# Patient Record
Sex: FEMALE | Race: BLACK OR AFRICAN AMERICAN | Employment: UNEMPLOYED | URBAN - METROPOLITAN AREA
[De-identification: names, ages, dates, MRNs, and addresses within clinical notes are randomized per-mention and may not be internally consistent; named-entity substitution may affect disease eponyms.]

---

## 2021-11-30 ENCOUNTER — HOSPITAL ENCOUNTER (INPATIENT)
Age: 66
LOS: 2 days | Discharge: HOME | DRG: 468 | End: 2021-12-02
Attending: EMERGENCY MEDICINE | Admitting: STUDENT IN AN ORGANIZED HEALTH CARE EDUCATION/TRAINING PROGRAM
Payer: MEDICAID

## 2021-11-30 ENCOUNTER — APPOINTMENT (OUTPATIENT)
Dept: CT IMAGING | Age: 66
DRG: 468 | End: 2021-11-30
Payer: MEDICAID

## 2021-11-30 ENCOUNTER — APPOINTMENT (OUTPATIENT)
Dept: ULTRASOUND IMAGING | Age: 66
DRG: 468 | End: 2021-11-30
Payer: MEDICAID

## 2021-11-30 DIAGNOSIS — R74.8 ELEVATED LIPASE: ICD-10-CM

## 2021-11-30 DIAGNOSIS — R52 INTRACTABLE PAIN: ICD-10-CM

## 2021-11-30 DIAGNOSIS — R10.12 INTRACTABLE LEFT UPPER QUADRANT ABDOMINAL PAIN: Primary | ICD-10-CM

## 2021-11-30 DIAGNOSIS — R93.89 ABNORMAL FINDING ON CT SCAN: ICD-10-CM

## 2021-11-30 PROBLEM — R10.9 ABDOMINAL PAIN: Status: ACTIVE | Noted: 2021-11-30

## 2021-11-30 LAB
A/G RATIO: 1.2 (ref 1.1–2.2)
ALBUMIN SERPL-MCNC: 4.1 G/DL (ref 3.4–5)
ALP BLD-CCNC: 64 U/L (ref 40–129)
ALT SERPL-CCNC: 15 U/L (ref 10–40)
ANION GAP SERPL CALCULATED.3IONS-SCNC: 14 MMOL/L (ref 3–16)
AST SERPL-CCNC: 14 U/L (ref 15–37)
BACTERIA: ABNORMAL /HPF
BASOPHILS ABSOLUTE: 0 K/UL (ref 0–0.2)
BASOPHILS RELATIVE PERCENT: 0.4 %
BILIRUB SERPL-MCNC: 0.3 MG/DL (ref 0–1)
BILIRUBIN URINE: NEGATIVE
BLOOD, URINE: NEGATIVE
BUN BLDV-MCNC: 14 MG/DL (ref 7–20)
CALCIUM SERPL-MCNC: 9.8 MG/DL (ref 8.3–10.6)
CHLORIDE BLD-SCNC: 103 MMOL/L (ref 99–110)
CLARITY: CLEAR
CO2: 20 MMOL/L (ref 21–32)
COLOR: YELLOW
CREAT SERPL-MCNC: 0.7 MG/DL (ref 0.6–1.2)
D DIMER: <200 NG/ML DDU (ref 0–229)
EKG ATRIAL RATE: 94 BPM
EKG DIAGNOSIS: NORMAL
EKG P AXIS: 42 DEGREES
EKG P-R INTERVAL: 108 MS
EKG Q-T INTERVAL: 380 MS
EKG QRS DURATION: 100 MS
EKG QTC CALCULATION (BAZETT): 475 MS
EKG R AXIS: 14 DEGREES
EKG T AXIS: 82 DEGREES
EKG VENTRICULAR RATE: 94 BPM
EOSINOPHILS ABSOLUTE: 0.1 K/UL (ref 0–0.6)
EOSINOPHILS RELATIVE PERCENT: 1.6 %
EPITHELIAL CELLS, UA: 5 /HPF (ref 0–5)
GFR AFRICAN AMERICAN: >60
GFR NON-AFRICAN AMERICAN: >60
GLUCOSE BLD-MCNC: 218 MG/DL (ref 70–99)
GLUCOSE URINE: 100 MG/DL
HCT VFR BLD CALC: 40.9 % (ref 36–48)
HEMOGLOBIN: 13 G/DL (ref 12–16)
HYALINE CASTS: 1 /LPF (ref 0–8)
KETONES, URINE: NEGATIVE MG/DL
LEUKOCYTE ESTERASE, URINE: NEGATIVE
LIPASE: 119 U/L (ref 13–60)
LYMPHOCYTES ABSOLUTE: 2.8 K/UL (ref 1–5.1)
LYMPHOCYTES RELATIVE PERCENT: 32.1 %
MCH RBC QN AUTO: 23.2 PG (ref 26–34)
MCHC RBC AUTO-ENTMCNC: 31.8 G/DL (ref 31–36)
MCV RBC AUTO: 73 FL (ref 80–100)
MICROSCOPIC EXAMINATION: YES
MONOCYTES ABSOLUTE: 0.7 K/UL (ref 0–1.3)
MONOCYTES RELATIVE PERCENT: 8.1 %
NEUTROPHILS ABSOLUTE: 5 K/UL (ref 1.7–7.7)
NEUTROPHILS RELATIVE PERCENT: 57.8 %
NITRITE, URINE: NEGATIVE
PDW BLD-RTO: 15.1 % (ref 12.4–15.4)
PH UA: 5.5 (ref 5–8)
PLATELET # BLD: 207 K/UL (ref 135–450)
PMV BLD AUTO: 10.2 FL (ref 5–10.5)
POTASSIUM REFLEX MAGNESIUM: 4.3 MMOL/L (ref 3.5–5.1)
PROTEIN UA: 100 MG/DL
RBC # BLD: 5.6 M/UL (ref 4–5.2)
RBC UA: 2 /HPF (ref 0–4)
REASON FOR REJECTION: NORMAL
REJECTED TEST: NORMAL
SODIUM BLD-SCNC: 137 MMOL/L (ref 136–145)
SPECIFIC GRAVITY UA: 1.02 (ref 1–1.03)
TOTAL PROTEIN: 7.5 G/DL (ref 6.4–8.2)
TROPONIN: <0.01 NG/ML
URINE REFLEX TO CULTURE: ABNORMAL
URINE TYPE: ABNORMAL
UROBILINOGEN, URINE: 1 E.U./DL
WBC # BLD: 8.7 K/UL (ref 4–11)
WBC UA: 3 /HPF (ref 0–5)

## 2021-11-30 PROCEDURE — 2580000003 HC RX 258: Performed by: NURSE PRACTITIONER

## 2021-11-30 PROCEDURE — 2580000003 HC RX 258: Performed by: STUDENT IN AN ORGANIZED HEALTH CARE EDUCATION/TRAINING PROGRAM

## 2021-11-30 PROCEDURE — 6360000002 HC RX W HCPCS: Performed by: NURSE PRACTITIONER

## 2021-11-30 PROCEDURE — 1200000000 HC SEMI PRIVATE

## 2021-11-30 PROCEDURE — 85025 COMPLETE CBC W/AUTO DIFF WBC: CPT

## 2021-11-30 PROCEDURE — 85379 FIBRIN DEGRADATION QUANT: CPT

## 2021-11-30 PROCEDURE — 36415 COLL VENOUS BLD VENIPUNCTURE: CPT

## 2021-11-30 PROCEDURE — 96375 TX/PRO/DX INJ NEW DRUG ADDON: CPT

## 2021-11-30 PROCEDURE — 6360000002 HC RX W HCPCS: Performed by: EMERGENCY MEDICINE

## 2021-11-30 PROCEDURE — 99285 EMERGENCY DEPT VISIT HI MDM: CPT

## 2021-11-30 PROCEDURE — 83690 ASSAY OF LIPASE: CPT

## 2021-11-30 PROCEDURE — 93005 ELECTROCARDIOGRAM TRACING: CPT | Performed by: EMERGENCY MEDICINE

## 2021-11-30 PROCEDURE — 6360000004 HC RX CONTRAST MEDICATION: Performed by: NURSE PRACTITIONER

## 2021-11-30 PROCEDURE — 93010 ELECTROCARDIOGRAM REPORT: CPT | Performed by: INTERNAL MEDICINE

## 2021-11-30 PROCEDURE — 76856 US EXAM PELVIC COMPLETE: CPT

## 2021-11-30 PROCEDURE — 84484 ASSAY OF TROPONIN QUANT: CPT

## 2021-11-30 PROCEDURE — 76830 TRANSVAGINAL US NON-OB: CPT

## 2021-11-30 PROCEDURE — 96374 THER/PROPH/DIAG INJ IV PUSH: CPT

## 2021-11-30 PROCEDURE — 81001 URINALYSIS AUTO W/SCOPE: CPT

## 2021-11-30 PROCEDURE — 74177 CT ABD & PELVIS W/CONTRAST: CPT

## 2021-11-30 PROCEDURE — 80053 COMPREHEN METABOLIC PANEL: CPT

## 2021-11-30 RX ORDER — SODIUM CHLORIDE 0.9 % (FLUSH) 0.9 %
5-40 SYRINGE (ML) INJECTION PRN
Status: DISCONTINUED | OUTPATIENT
Start: 2021-11-30 | End: 2021-12-02 | Stop reason: HOSPADM

## 2021-11-30 RX ORDER — METOPROLOL SUCCINATE 25 MG/1
25 TABLET, EXTENDED RELEASE ORAL DAILY
Status: DISCONTINUED | OUTPATIENT
Start: 2021-12-01 | End: 2021-12-02 | Stop reason: HOSPADM

## 2021-11-30 RX ORDER — AMLODIPINE BESYLATE 10 MG/1
10 TABLET ORAL DAILY
COMMUNITY

## 2021-11-30 RX ORDER — SODIUM CHLORIDE 0.9 % (FLUSH) 0.9 %
5-40 SYRINGE (ML) INJECTION EVERY 12 HOURS SCHEDULED
Status: DISCONTINUED | OUTPATIENT
Start: 2021-11-30 | End: 2021-12-02 | Stop reason: HOSPADM

## 2021-11-30 RX ORDER — ASPIRIN 81 MG/1
81 TABLET ORAL DAILY
Status: DISCONTINUED | OUTPATIENT
Start: 2021-12-01 | End: 2021-12-02 | Stop reason: HOSPADM

## 2021-11-30 RX ORDER — DEXTROSE MONOHYDRATE 50 MG/ML
100 INJECTION, SOLUTION INTRAVENOUS PRN
Status: DISCONTINUED | OUTPATIENT
Start: 2021-11-30 | End: 2021-12-02 | Stop reason: HOSPADM

## 2021-11-30 RX ORDER — ASPIRIN 81 MG/1
81 TABLET ORAL DAILY
COMMUNITY

## 2021-11-30 RX ORDER — AMLODIPINE BESYLATE 5 MG/1
10 TABLET ORAL DAILY
Status: DISCONTINUED | OUTPATIENT
Start: 2021-12-01 | End: 2021-12-02 | Stop reason: HOSPADM

## 2021-11-30 RX ORDER — SODIUM CHLORIDE 9 MG/ML
INJECTION, SOLUTION INTRAVENOUS CONTINUOUS
Status: ACTIVE | OUTPATIENT
Start: 2021-11-30 | End: 2021-12-01

## 2021-11-30 RX ORDER — OXYCODONE HYDROCHLORIDE 5 MG/1
5 TABLET ORAL EVERY 6 HOURS PRN
Status: DISCONTINUED | OUTPATIENT
Start: 2021-11-30 | End: 2021-11-30 | Stop reason: SDUPTHER

## 2021-11-30 RX ORDER — LISINOPRIL 40 MG/1
40 TABLET ORAL DAILY
COMMUNITY

## 2021-11-30 RX ORDER — SODIUM CHLORIDE 9 MG/ML
25 INJECTION, SOLUTION INTRAVENOUS PRN
Status: DISCONTINUED | OUTPATIENT
Start: 2021-11-30 | End: 2021-12-02 | Stop reason: HOSPADM

## 2021-11-30 RX ORDER — 0.9 % SODIUM CHLORIDE 0.9 %
1000 INTRAVENOUS SOLUTION INTRAVENOUS ONCE
Status: COMPLETED | OUTPATIENT
Start: 2021-11-30 | End: 2021-11-30

## 2021-11-30 RX ORDER — MORPHINE SULFATE 4 MG/ML
4 INJECTION, SOLUTION INTRAMUSCULAR; INTRAVENOUS ONCE
Status: COMPLETED | OUTPATIENT
Start: 2021-11-30 | End: 2021-11-30

## 2021-11-30 RX ORDER — POLYETHYLENE GLYCOL 3350 17 G/17G
17 POWDER, FOR SOLUTION ORAL DAILY PRN
COMMUNITY

## 2021-11-30 RX ORDER — M-VIT,TX,IRON,MINS/CALC/FOLIC 27MG-0.4MG
1 TABLET ORAL DAILY
COMMUNITY

## 2021-11-30 RX ORDER — OXYCODONE HYDROCHLORIDE 5 MG/1
2.5 TABLET ORAL EVERY 6 HOURS PRN
Status: DISCONTINUED | OUTPATIENT
Start: 2021-11-30 | End: 2021-12-02 | Stop reason: HOSPADM

## 2021-11-30 RX ORDER — NICOTINE POLACRILEX 4 MG
15 LOZENGE BUCCAL PRN
Status: DISCONTINUED | OUTPATIENT
Start: 2021-11-30 | End: 2021-12-02 | Stop reason: HOSPADM

## 2021-11-30 RX ORDER — ALBUTEROL SULFATE 90 UG/1
2 AEROSOL, METERED RESPIRATORY (INHALATION) EVERY 6 HOURS PRN
COMMUNITY

## 2021-11-30 RX ORDER — DEXTROSE MONOHYDRATE 25 G/50ML
12.5 INJECTION, SOLUTION INTRAVENOUS PRN
Status: DISCONTINUED | OUTPATIENT
Start: 2021-11-30 | End: 2021-12-02 | Stop reason: HOSPADM

## 2021-11-30 RX ORDER — ONDANSETRON 2 MG/ML
4 INJECTION INTRAMUSCULAR; INTRAVENOUS ONCE
Status: COMPLETED | OUTPATIENT
Start: 2021-11-30 | End: 2021-11-30

## 2021-11-30 RX ORDER — OXYCODONE HYDROCHLORIDE 5 MG/1
5 TABLET ORAL EVERY 6 HOURS PRN
Status: DISCONTINUED | OUTPATIENT
Start: 2021-11-30 | End: 2021-12-02 | Stop reason: HOSPADM

## 2021-11-30 RX ORDER — ATORVASTATIN CALCIUM 40 MG/1
40 TABLET, FILM COATED ORAL DAILY
COMMUNITY

## 2021-11-30 RX ORDER — DULAGLUTIDE 1.5 MG/.5ML
1.5 INJECTION, SOLUTION SUBCUTANEOUS WEEKLY
COMMUNITY

## 2021-11-30 RX ORDER — ONDANSETRON 2 MG/ML
4 INJECTION INTRAMUSCULAR; INTRAVENOUS EVERY 6 HOURS PRN
Status: DISCONTINUED | OUTPATIENT
Start: 2021-11-30 | End: 2021-12-02 | Stop reason: HOSPADM

## 2021-11-30 RX ORDER — OXYCODONE HYDROCHLORIDE 5 MG/1
2.5 TABLET ORAL EVERY 6 HOURS PRN
Status: DISCONTINUED | OUTPATIENT
Start: 2021-11-30 | End: 2021-11-30 | Stop reason: SDUPTHER

## 2021-11-30 RX ORDER — INSULIN GLARGINE 100 [IU]/ML
20 INJECTION, SOLUTION SUBCUTANEOUS NIGHTLY PRN
COMMUNITY

## 2021-11-30 RX ORDER — METOPROLOL SUCCINATE 25 MG/1
25 TABLET, EXTENDED RELEASE ORAL DAILY
COMMUNITY

## 2021-11-30 RX ORDER — NALOXONE HYDROCHLORIDE 0.4 MG/ML
0.4 INJECTION, SOLUTION INTRAMUSCULAR; INTRAVENOUS; SUBCUTANEOUS PRN
Status: DISCONTINUED | OUTPATIENT
Start: 2021-11-30 | End: 2021-12-02 | Stop reason: HOSPADM

## 2021-11-30 RX ADMIN — HYDROMORPHONE HYDROCHLORIDE 0.5 MG: 1 INJECTION, SOLUTION INTRAMUSCULAR; INTRAVENOUS; SUBCUTANEOUS at 19:35

## 2021-11-30 RX ADMIN — ONDANSETRON 4 MG: 2 INJECTION INTRAMUSCULAR; INTRAVENOUS at 15:37

## 2021-11-30 RX ADMIN — SODIUM CHLORIDE 1000 ML: 9 INJECTION, SOLUTION INTRAVENOUS at 15:36

## 2021-11-30 RX ADMIN — SODIUM CHLORIDE: 9 INJECTION, SOLUTION INTRAVENOUS at 23:40

## 2021-11-30 RX ADMIN — SODIUM CHLORIDE, PRESERVATIVE FREE 10 ML: 5 INJECTION INTRAVENOUS at 23:01

## 2021-11-30 RX ADMIN — MORPHINE SULFATE 4 MG: 4 INJECTION, SOLUTION INTRAMUSCULAR; INTRAVENOUS at 15:37

## 2021-11-30 RX ADMIN — IOPAMIDOL 75 ML: 755 INJECTION, SOLUTION INTRAVENOUS at 15:46

## 2021-11-30 RX ADMIN — HYDROMORPHONE HYDROCHLORIDE 0.5 MG: 1 INJECTION, SOLUTION INTRAMUSCULAR; INTRAVENOUS; SUBCUTANEOUS at 17:59

## 2021-11-30 ASSESSMENT — PAIN SCALES - GENERAL
PAINLEVEL_OUTOF10: 9
PAINLEVEL_OUTOF10: 7
PAINLEVEL_OUTOF10: 7
PAINLEVEL_OUTOF10: 10
PAINLEVEL_OUTOF10: 8
PAINLEVEL_OUTOF10: 8

## 2021-11-30 ASSESSMENT — PAIN DESCRIPTION - PAIN TYPE
TYPE: ACUTE PAIN

## 2021-11-30 ASSESSMENT — PAIN DESCRIPTION - ONSET: ONSET: ON-GOING

## 2021-11-30 ASSESSMENT — ENCOUNTER SYMPTOMS
BLOOD IN STOOL: 0
NAUSEA: 1
COUGH: 1
VOMITING: 0
ANAL BLEEDING: 0
SORE THROAT: 0
BACK PAIN: 0
ABDOMINAL PAIN: 1
SHORTNESS OF BREATH: 0
EYE PAIN: 0
CONSTIPATION: 1

## 2021-11-30 ASSESSMENT — PAIN DESCRIPTION - LOCATION
LOCATION: ABDOMEN

## 2021-11-30 ASSESSMENT — PAIN DESCRIPTION - PROGRESSION: CLINICAL_PROGRESSION: GRADUALLY IMPROVING

## 2021-11-30 ASSESSMENT — PAIN DESCRIPTION - DESCRIPTORS: DESCRIPTORS: CONSTANT;DISCOMFORT

## 2021-11-30 ASSESSMENT — PAIN DESCRIPTION - ORIENTATION
ORIENTATION: UPPER;LEFT
ORIENTATION: LEFT;UPPER
ORIENTATION: UPPER;LEFT

## 2021-11-30 NOTE — ED NOTES
While trying to place an IV, pt stated \"you're only allowed to stick me once. \" Then asked me, \"how long have you been doing this job, because \"you're going to have to get someone else. \" I was able to place 22g in the left [facility].       Kosta Martinez RN  11/30/21 [address] RN  11/30/21 3866

## 2021-11-30 NOTE — ED NOTES
Patient approached this RN while calling for another patient, patient appears agitated and states \"I am diabetic and need to eat something, I have waiting out here for two hours I need to eat something. \" Lillian Jorgensen NP notified and made aware of pt concerns, JERMAINE Thompson states \"patient needs a CT scan and is unable to eat and drink at this time. \" Pt informed, still appears agitated but agreeable to sit and wait in the lobby.       Silas Nicole RN  11/30/21 8495

## 2021-11-30 NOTE — ED NOTES
Lab called to say bloodwork was hemolysis. Sending repeat bloodwork.        Nathalie Sheffield RN  11/30/21 7893

## 2021-11-30 NOTE — ED PROVIDER NOTES
caliber/quantity of stool) and nausea. Negative for anal bleeding, blood in stool and vomiting. Genitourinary: Negative for difficulty urinating, dysuria, frequency and urgency. Musculoskeletal: Negative for back pain and neck pain. Skin: Negative for rash and wound. Neurological: Positive for light-headedness. Negative for dizziness. Hematological: Negative. Psychiatric/Behavioral: Negative. PAST MEDICAL HISTORY   No past medical history on file. SURGICAL HISTORY   No past surgical history on file. CURRENTMEDICATIONS       Previous Medications    No medications on file       ALLERGIES     Patient has no known allergies. FAMILYHISTORY     No family history on file. SOCIAL HISTORY       Social History     Socioeconomic History    Marital status: Single     Spouse name: Not on file    Number of children: Not on file    Years of education: Not on file    Highest education level: Not on file   Occupational History    Not on file   Tobacco Use    Smoking status: Not on file    Smokeless tobacco: Not on file   Substance and Sexual Activity    Alcohol use: Not on file    Drug use: Not on file    Sexual activity: Not on file   Other Topics Concern    Not on file   Social History Narrative    Not on file     Social Determinants of Health     Financial Resource Strain:     Difficulty of Paying Living Expenses: Not on file   Food Insecurity:     Worried About Running Out of Food in the Last Year: Not on file    Saran of Food in the Last Year: Not on file   Transportation Needs:     Lack of Transportation (Medical): Not on file    Lack of Transportation (Non-Medical):  Not on file   Physical Activity:     Days of Exercise per Week: Not on file    Minutes of Exercise per Session: Not on file   Stress:     Feeling of Stress : Not on file   Social Connections:     Frequency of Communication with Friends and Family: Not on file    Frequency of Social Gatherings with Friends and Family: Not on file    Attends Je[employer] witness Services: Not on file    Active Member of Clubs or Organizations: Not on file    Attends Club or Organization Meetings: Not on file    Marital Status: Not on file   Intimate Partner Violence:     Fear of Current or Ex-Partner: Not on file    Emotionally Abused: Not on file    Physically Abused: Not on file    Sexually Abused: Not on file   Housing Stability:     Unable to Pay for Housing in the Last Year: Not on file    Number of [place] in the Last Year: Not on file    Unstable Housing in the Last Year: Not on file       SCREENINGS             PHYSICAL EXAM    (up to 7 for level 4, 8 or more for level 5)     ED Triage Vitals [11/30/21 1244]   BP Temp Temp Source Pulse Resp SpO2 Height Weight   (!) 154/87 97.7 °F (36.5 °C) Temporal 103 18 96 % 5' 2.5\" (1.588 m) 141 lb 15.6 oz (64.4 kg)       Physical Exam  Vitals and nursing note reviewed. Constitutional:       Appearance: Normal appearance. She is not diaphoretic. HENT:      Head: Normocephalic and atraumatic. Right Ear: External ear normal.      Left Ear: External ear normal.      Nose: Nose normal.      Mouth/Throat:      Mouth: Mucous membranes are moist.   Eyes:      General:         Right eye: No discharge. Left eye: No discharge. Extraocular Movements: Extraocular movements intact. Cardiovascular:      Rate and Rhythm: Regular rhythm. Tachycardia present. Heart sounds: No murmur heard. No friction rub. No gallop. Pulmonary:      Effort: Pulmonary effort is normal. No respiratory distress. Abdominal:      General: Bowel sounds are normal.      Palpations: Abdomen is soft. Tenderness: There is abdominal tenderness in the left upper quadrant. There is left CVA tenderness and guarding. Negative signs include Alanis's sign, Rovsing's sign, McBurney's sign, psoas sign and obturator sign. Musculoskeletal:         General: Normal range of motion.       Cervical back: Normal range of motion and neck supple. Skin:     General: Skin is warm and dry. Capillary Refill: Capillary refill takes less than 2 seconds. Neurological:      Mental Status: She is alert and oriented to person, place, and time. Psychiatric:         Mood and Affect: Mood normal.         Behavior: Behavior normal.         DIAGNOSTIC RESULTS   LABS:    Labs Reviewed   CBC WITH AUTO DIFFERENTIAL - Abnormal; Notable for the following components:       Result Value    RBC 5.60 (*)     MCV 73.0 (*)     MCH 23.2 (*)     All other components within normal limits    Narrative:     Performed at:  [address] [place] [address]   Phone [phone]   COMPREHENSIVE METABOLIC PANEL W/ REFLEX TO MG FOR LOW K - Abnormal; Notable for the following components:    CO2 20 (*)     Glucose 218 (*)     AST 14 (*)     All other components within normal limits    Narrative:     Performed [address]:  [address] [address]   Phone [phone]   LIPASE - Abnormal; Notable for the following components:    Lipase 119.0 (*)     All other components within normal limits    Narrative:     Performed at:  [address] [place] [address]   Phone [phone]   URINE RT REFLEX TO CULTURE       When ordered, only abnormal lab results are displayed. All other labs were within normal range or not returned as of this dictation. EKG: When ordered, EKG's are interpreted by the Emergency Department Physician in the absence of a cardiologist.  Please see their note for interpretation of EKG.       RADIOLOGY:   Non-plain film images such as CT, Ultrasound and MRI are read by the radiologist. Plain radiographic images are visualized andpreliminarily interpreted by the  ED Provider with the below findings:        Interpretation perthe Radiologist below, if available at the time of this note:    CT ABDOMEN PELVIS W IV CONTRAST Additional Contrast? None    (Results Pending)     No results found. PROCEDURES   Unless otherwise noted below, none     Procedures    CRITICAL CARE TIME   N/A    CONSULTS:  None      EMERGENCY DEPARTMENT COURSE and DIFFERENTIAL DIAGNOSIS/MDM:     Patient presents to the emergency department with RUQ abdominal pain with radiation to the left flank. Alternate diagnoses were considered but less likely based on history and physical.  Considered kidney stone, pyelonephritis, UTI, appendicitis, bowel obstruction, diverticulitis, hernia, gastritis/gastroenteritis, pancreatitis, cholecystitis, hepatitis, constipation, IBS, IBD, among others are less likely based on history and physical.    We will obtain abdominal and cardiac labs as patient is experiencing LUQ pain with nausea. No elevation in D-dimer placed the patient is very low risk for therefore no no CT PE was obtained. Urine reflex to culture shows a glucose 100 and protein 100. Microscopic urinalysis shows bacteria 1+ with no RBCs or RBCs. Lipase was elevated at 119 however CT shows no pancreatitis. CMP negative for electrolyte derangement. CO2 is decreased at 20 and glucose elevated to 18/hyperglycemic. No elevation in LFTs. CT A/P reveals \"   1. \"No evident acute findings in the abdomen or pelvis.  Specifically, no   findings suggestive of acute pancreatitis despite elevated serum lipase. 2. Mild dilation of the main pancreatic duct is likely related to mild to   moderate parenchymal atrophy. Gia Section is no concomitant biliary dilation to   suggest an obstructing pancreatic or ampullary lesion. 3. An indeterminate 0.9 cm lesion in the upper pole of the left kidney may be   a solid neoplasm or complicated cyst.  Recommend further evaluation with   renal protocol abdomen MRI (preferred) or CT on a nonemergent basis. 4. A 1.3 cm lesion in the right adrenal gland is most likely an adenoma. Recommend follow-up with adrenal protocol abdomen CT in 1 year as below.  Of   note, this could also be evaluated on the above recommended MRI. 5. A 4.1 cm left adnexal cystic lesion may have wall thickening and arises   from the left ovary, suggesting the possibility of a left ovarian neoplasm. \"     There is significant elevation of lipase at 119 but CT imaging reveals no pancreatitis. Patient's pain is intractable and would like to have the patient admitted for further evaluation via US pelvic ultrasound as soon as possible to further characterize lesions and to provide pain control as patient's pain has been refractory to multiple doses of narcotic pain medication. Patient is agreeable with plan for admission. Patient will be admitted to the hospital for further evaluation treatment.         Vitals:    Vitals:    11/30/21 1244   BP: (!) 154/87   Pulse: 103   Resp: 18   Temp: 97.7 °F (36.5 °C)   TempSrc: Temporal   SpO2: 96%   Weight: 141 lb 15.6 oz (64.4 kg)   Height: 5' 2.5\" (1.588 m)       Patient was given thefollowing medications:  ED Medication Orders (From admission, onward)    Start Ordered     Status Ordering Provider    12/01/21 0900 11/30/21 2151  enoxaparin (LOVENOX) injection 40 mg  DAILY         Acknowledged CALISTA RAYO     12/01/21 0900 11/30/21 2151  aspirin EC tablet 81 mg  DAILY         Acknowledged CALISTA RAYO     12/01/21 0900 11/30/21 2151  amLODIPine (NORVASC) tablet 10 mg  DAILY         Acknowledged CALISTA RAYO     12/01/21 0900 11/30/21 2151  metoprolol succinate (TOPROL XL) extended release tablet 25 mg  DAILY         Acknowledged CALISTA RAYO     12/01/21 0800 11/30/21 2151  insulin lispro (HUMALOG) injection vial 0-6 Units  3 TIMES DAILY WITH MEALS         Acknowledged Gurdeep Kendrick     11/30/21 2211 11/30/21 2212  oxyCODONE (ROXICODONE) immediate release tablet 2.5 mg  EVERY 6 HOURS PRN        \"Or\" Linked Group Details    Acknowledged [facility] [employer], [address] 11/30/21 2211 11/30/21 2212  oxyCODONE (ROXICODONE) immediate release tablet 5 mg  EVERY 6 HOURS PRN        \"Or\" Linked Group Details    Acknowledged Radha Pastor    11/30/21 2200 11/30/21 2151  0.9 % sodium chloride infusion  CONTINUOUS         Acknowledged CALISTA RAYO     11/30/21 2200 11/30/21 2151  sodium chloride flush 0.9 % injection 5-40 mL  2 times per day         Last MAR action: Given - by Lennox Schaffer on 11/30/21 at [address] [facility]    11/30/21 2200 11/30/21 2151  insulin lispro (HUMALOG) injection vial 0-3 Units  NIGHTLY         Acknowledged Radha Pastor    11/30/21 2151 11/30/21 2151  sodium chloride flush 0.9 % injection 5-40 mL  PRN         Acknowledged CALISTA RAYO     11/30/21 2151 11/30/21 2151  HYDROmorphone (DILAUDID) injection 0.25 mg  EVERY 4 HOURS PRN         Acknowledged SHADE RAYOMOL     11/30/21 2151 11/30/21 2151  0.9 % sodium chloride infusion  PRN         Acknowledged CALISTA RAYO     11/30/21 2151 11/30/21 2151  naloxone (NARCAN) injection 0.4 mg  PRN         Acknowledged GIRMA CALISTA     11/30/21 2151 11/30/21 2151  ondansetron (ZOFRAN) injection 4 mg  EVERY 6 HOURS PRN         Acknowledged WILIAM RAYOL     11/30/21 2151 11/30/21 2151  glucose (GLUTOSE) 40 % oral gel 15 g  PRN         Acknowledged GIRMA, CALISTA     11/30/21 2151 11/30/21 2151  dextrose 50 % IV solution  PRN         Acknowledged SHADE RAYOMOL     11/30/21 2151 11/30/21 2151  glucagon (rDNA) injection 1 mg  PRN         Acknowledged GIRMA CALISTA     11/30/21 2151 11/30/21 2151  dextrose 5 % solution  PRN         Acknowledged WILIAM RAYOL     11/30/21 1930 11/30/21 1923  HYDROmorphone (DILAUDID) injection 0.5 mg  ONCE         Last MAR action: Given - by Lennox Schaffer on 11/30/21 at [facility]    11/30/21 1900 11/30/21 1854  HYDROmorphone (DILAUDID) injection 1 mg  ONCE         Last MAR action: Not Given - by Feng Israel on 11/30/21 at [address] [facility]    11/30/21 1630 11/30/21 1622  HYDROmorphone (DILAUDID) injection 0.5 mg  ONCE         Last MAR action: Given - by Bethany Childers on 11/30/21 at [address]    11/30/21 1542 11/30/21 1542  iopamidol (ISOVUE-370) 76 % injection 75 mL  IMG ONCE PRN         Last MAR action: Given - by Chuck Heman on 11/30/21 at [address]    11/30/21 1530 11/30/21 1517  ondansetron (ZOFRAN) injection 4 mg  ONCE         Last MAR action: Given - by Bethany [place] on 11/30/21 at [address], [place] E    11/30/21 1530 11/30/21 1517  morphine (PF) injection 4 mg  ONCE         Last MAR action: Given - by Bandy [place] on 11/30/21 at [address], [place] [address]    11/30/21 1530 11/30/21 1517  0.9 % sodium chloride bolus  ONCE         Last MAR action: Stopped - by Bandy [place] on 11/30/21 at [address]                 FINAL IMPRESSION    Abnormal finding on CT scan  Elevated lipase  Intractable left upper quadrant abdominal pain  Intractable pain-left flank      Disposition:  Admitted      Patient will be admitted to hospital for further evaluation and treatment.        Hospitalist: Dr. Darinel Freitas      Discussed patients HPI, ED work-up, results, treatment, and response with my attending physician Dr. Donald Perez and the Hospitalist -Dr. Darinel Freitas who agrees to admit the patient to the hospital.        (Please note that portions ofthis note were completed with a voice recognition program.  Efforts were made to edit the dictations but occasionally words are mis-transcribed.)    CHIDI Keita - DEANA (electronically signed)              CHIDI Keita CNP  11/30/21 5557

## 2021-11-30 NOTE — ED NOTES
Pt requesting another blanket, because the first one wasn't warm enough.       Tab Duran RN  11/30/21 0036

## 2021-11-30 NOTE — ED NOTES
Pt called out needing to use the restroom; gait even and steady.      Bal Davison, RAUL  11/30/21 6964

## 2021-12-01 ENCOUNTER — APPOINTMENT (OUTPATIENT)
Dept: ULTRASOUND IMAGING | Age: 66
DRG: 468 | End: 2021-12-01
Payer: MEDICAID

## 2021-12-01 LAB
A/G RATIO: 1.3 (ref 1.1–2.2)
ALBUMIN SERPL-MCNC: 3.8 G/DL (ref 3.4–5)
ALP BLD-CCNC: 57 U/L (ref 40–129)
ALT SERPL-CCNC: 13 U/L (ref 10–40)
ANION GAP SERPL CALCULATED.3IONS-SCNC: 11 MMOL/L (ref 3–16)
AST SERPL-CCNC: 13 U/L (ref 15–37)
BASOPHILS ABSOLUTE: 0 K/UL (ref 0–0.2)
BASOPHILS RELATIVE PERCENT: 0.5 %
BILIRUB SERPL-MCNC: 0.4 MG/DL (ref 0–1)
BUN BLDV-MCNC: 10 MG/DL (ref 7–20)
CA 125: 11 U/ML (ref 0–35)
CALCIUM SERPL-MCNC: 8.8 MG/DL (ref 8.3–10.6)
CEA: 5.4 NG/ML (ref 0–5)
CHLORIDE BLD-SCNC: 105 MMOL/L (ref 99–110)
CO2: 23 MMOL/L (ref 21–32)
CREAT SERPL-MCNC: 0.5 MG/DL (ref 0.6–1.2)
EKG ATRIAL RATE: 82 BPM
EKG DIAGNOSIS: NORMAL
EKG P AXIS: 22 DEGREES
EKG P-R INTERVAL: 156 MS
EKG Q-T INTERVAL: 358 MS
EKG QRS DURATION: 90 MS
EKG QTC CALCULATION (BAZETT): 418 MS
EKG R AXIS: 9 DEGREES
EKG T AXIS: 44 DEGREES
EKG VENTRICULAR RATE: 82 BPM
EOSINOPHILS ABSOLUTE: 0.2 K/UL (ref 0–0.6)
EOSINOPHILS RELATIVE PERCENT: 2.3 %
GFR AFRICAN AMERICAN: >60
GFR NON-AFRICAN AMERICAN: >60
GLUCOSE BLD-MCNC: 145 MG/DL (ref 70–99)
GLUCOSE BLD-MCNC: 148 MG/DL (ref 70–99)
GLUCOSE BLD-MCNC: 163 MG/DL (ref 70–99)
GLUCOSE BLD-MCNC: 176 MG/DL (ref 70–99)
GLUCOSE BLD-MCNC: 205 MG/DL
GLUCOSE BLD-MCNC: 205 MG/DL
GLUCOSE BLD-MCNC: 205 MG/DL (ref 70–99)
GLUCOSE BLD-MCNC: 234 MG/DL (ref 70–99)
HCT VFR BLD CALC: 39.3 % (ref 36–48)
HEMOGLOBIN: 12.2 G/DL (ref 12–16)
LYMPHOCYTES ABSOLUTE: 3.7 K/UL (ref 1–5.1)
LYMPHOCYTES RELATIVE PERCENT: 41.7 %
MAGNESIUM: 1.5 MG/DL (ref 1.8–2.4)
MCH RBC QN AUTO: 22.8 PG (ref 26–34)
MCHC RBC AUTO-ENTMCNC: 30.9 G/DL (ref 31–36)
MCV RBC AUTO: 73.7 FL (ref 80–100)
MONOCYTES ABSOLUTE: 0.6 K/UL (ref 0–1.3)
MONOCYTES RELATIVE PERCENT: 6.7 %
NEUTROPHILS ABSOLUTE: 4.3 K/UL (ref 1.7–7.7)
NEUTROPHILS RELATIVE PERCENT: 48.8 %
PDW BLD-RTO: 14.6 % (ref 12.4–15.4)
PERFORMED ON: ABNORMAL
PLATELET # BLD: 175 K/UL (ref 135–450)
PMV BLD AUTO: 10.3 FL (ref 5–10.5)
POTASSIUM SERPL-SCNC: 4 MMOL/L (ref 3.5–5.1)
RBC # BLD: 5.34 M/UL (ref 4–5.2)
SODIUM BLD-SCNC: 139 MMOL/L (ref 136–145)
TOTAL PROTEIN: 6.8 G/DL (ref 6.4–8.2)
WBC # BLD: 8.9 K/UL (ref 4–11)

## 2021-12-01 PROCEDURE — 85025 COMPLETE CBC W/AUTO DIFF WBC: CPT

## 2021-12-01 PROCEDURE — 6370000000 HC RX 637 (ALT 250 FOR IP): Performed by: INTERNAL MEDICINE

## 2021-12-01 PROCEDURE — 6370000000 HC RX 637 (ALT 250 FOR IP): Performed by: OBSTETRICS & GYNECOLOGY

## 2021-12-01 PROCEDURE — 2580000003 HC RX 258: Performed by: STUDENT IN AN ORGANIZED HEALTH CARE EDUCATION/TRAINING PROGRAM

## 2021-12-01 PROCEDURE — 36415 COLL VENOUS BLD VENIPUNCTURE: CPT

## 2021-12-01 PROCEDURE — 1200000000 HC SEMI PRIVATE

## 2021-12-01 PROCEDURE — 83735 ASSAY OF MAGNESIUM: CPT

## 2021-12-01 PROCEDURE — 6370000000 HC RX 637 (ALT 250 FOR IP): Performed by: STUDENT IN AN ORGANIZED HEALTH CARE EDUCATION/TRAINING PROGRAM

## 2021-12-01 PROCEDURE — 80053 COMPREHEN METABOLIC PANEL: CPT

## 2021-12-01 PROCEDURE — 82378 CARCINOEMBRYONIC ANTIGEN: CPT

## 2021-12-01 PROCEDURE — 86304 IMMUNOASSAY TUMOR CA 125: CPT

## 2021-12-01 PROCEDURE — 93010 ELECTROCARDIOGRAM REPORT: CPT | Performed by: INTERNAL MEDICINE

## 2021-12-01 PROCEDURE — 86301 IMMUNOASSAY TUMOR CA 19-9: CPT

## 2021-12-01 RX ORDER — SENNA PLUS 8.6 MG/1
2 TABLET ORAL NIGHTLY
Status: DISCONTINUED | OUTPATIENT
Start: 2021-12-01 | End: 2021-12-02 | Stop reason: HOSPADM

## 2021-12-01 RX ADMIN — OXYCODONE 5 MG: 5 TABLET ORAL at 08:03

## 2021-12-01 RX ADMIN — INSULIN LISPRO 2 UNITS: 100 INJECTION, SOLUTION INTRAVENOUS; SUBCUTANEOUS at 17:41

## 2021-12-01 RX ADMIN — OXYCODONE 5 MG: 5 TABLET ORAL at 01:29

## 2021-12-01 RX ADMIN — SODIUM CHLORIDE, PRESERVATIVE FREE 10 ML: 5 INJECTION INTRAVENOUS at 09:51

## 2021-12-01 RX ADMIN — SENNOSIDES 17.2 MG: 8.6 TABLET, FILM COATED ORAL at 20:01

## 2021-12-01 RX ADMIN — INSULIN LISPRO 1 UNITS: 100 INJECTION, SOLUTION INTRAVENOUS; SUBCUTANEOUS at 00:33

## 2021-12-01 RX ADMIN — OXYCODONE 5 MG: 5 TABLET ORAL at 14:43

## 2021-12-01 RX ADMIN — MAGNESIUM HYDROXIDE 30 ML: 2400 SUSPENSION ORAL at 20:01

## 2021-12-01 RX ADMIN — SODIUM CHLORIDE, PRESERVATIVE FREE 10 ML: 5 INJECTION INTRAVENOUS at 21:00

## 2021-12-01 ASSESSMENT — PAIN DESCRIPTION - PAIN TYPE
TYPE: ACUTE PAIN
TYPE: ACUTE PAIN

## 2021-12-01 ASSESSMENT — PAIN DESCRIPTION - PROGRESSION
CLINICAL_PROGRESSION: GRADUALLY WORSENING
CLINICAL_PROGRESSION: NOT CHANGED

## 2021-12-01 ASSESSMENT — PAIN SCALES - GENERAL
PAINLEVEL_OUTOF10: 8
PAINLEVEL_OUTOF10: 9
PAINLEVEL_OUTOF10: 7
PAINLEVEL_OUTOF10: 0
PAINLEVEL_OUTOF10: 7
PAINLEVEL_OUTOF10: 5

## 2021-12-01 ASSESSMENT — PAIN DESCRIPTION - ONSET
ONSET: ON-GOING
ONSET: ON-GOING

## 2021-12-01 ASSESSMENT — PAIN - FUNCTIONAL ASSESSMENT
PAIN_FUNCTIONAL_ASSESSMENT: ACTIVITIES ARE NOT PREVENTED
PAIN_FUNCTIONAL_ASSESSMENT: ACTIVITIES ARE NOT PREVENTED

## 2021-12-01 ASSESSMENT — PAIN DESCRIPTION - LOCATION
LOCATION: ABDOMEN
LOCATION: ABDOMEN

## 2021-12-01 ASSESSMENT — PAIN DESCRIPTION - DESCRIPTORS
DESCRIPTORS: CONSTANT;DISCOMFORT
DESCRIPTORS: CONSTANT;DISCOMFORT

## 2021-12-01 ASSESSMENT — PAIN DESCRIPTION - FREQUENCY
FREQUENCY: CONTINUOUS
FREQUENCY: CONTINUOUS

## 2021-12-01 ASSESSMENT — PAIN DESCRIPTION - ORIENTATION: ORIENTATION: UPPER;LEFT

## 2021-12-01 NOTE — PROGRESS NOTES
Patient requested milk of magnesia. Dr. Plascencia Presume aware and notified. See new order.   Electronically signed by Gloria Black RN on 12/1/2021

## 2021-12-01 NOTE — ED NOTES
Patient nurse John Wallace, RAUL asked this nurse to give patient prescribed pain medication per order and MAR. This nurse went to give medication. Patient did not want the pain medication as prescribed. Charted not given. Patient asking for only . 5mg of dilaudid not a full 1 mg. Patient also wanting food, \"states, I have been here all day when are you all going to feed me I want to order something like now. \" This nurse communicated to patient that I would inform her nurse. This nurse notified patient nurse John Wallace, RAUL. This nurse and Dreama Nageotte, RN wasted medication in omnicell.       Augustin Esqueda RN  11/30/21 1939

## 2021-12-01 NOTE — PLAN OF CARE
Problem: Pain:  Goal: Pain level will decrease  Description: Pain level will decrease  Outcome: Ongoing  Note: Pain /discomfort being managed with PRN analgesics per MD orders. Patient able to express presence and absence of pain and rate pain appropriately using numerical scale. Goal: Control of acute pain  Description: Control of acute pain  Outcome: Ongoing  Goal: Control of chronic pain  Description: Control of chronic pain  Outcome: Ongoing     Problem: Falls - Risk of:  Goal: Will remain free from falls  Description: Will remain free from falls  Outcome: Ongoing  Note: Fall risk assessment completed . Fall precautions in place, bed alarm on, side rails 2/4 up, call light in reach, educated pt on calling for assistance when needed, room clear of clutter. Pt verbalized understanding. Problem: Skin Integrity:  Goal: Will show no infection signs and symptoms  Description: Will show no infection signs and symptoms  Outcome: Ongoing  Note: Patient is alert and oriented, afebrile, has manageable complaints of pain, skin is intact and appropriate for ethnicity in color    Goal: Absence of new skin breakdown  Description: Absence of new skin breakdown  Outcome: Ongoing  Note: Praveen score assessed. Patient able to ambulate and turn self and repositioned patient Q2H and assessed skin. Educated patient on importance of repositioning to prevent skin issues.

## 2021-12-01 NOTE — PROCEDURES
I spoke with Dr. Iveth Bojorquez regarding the STAT MRI order. He stated it is not STAT, patient waiting on Oncology consult and is admitted. MRI will attempt to perform the MRI today, but due to patient load, priority orders may trump this today. With patient admitted, MRI will be performed tomorrow if not able today.  Thank you

## 2021-12-01 NOTE — PROGRESS NOTES
Oncology Consult    I spoke with my partner, Dr. Iván Quezada, of Gynecology Oncology. It makes much more sense for her to see the patient for the ovarian lesion. She has agreed to see the patient.     Gregory Agustin MD

## 2021-12-01 NOTE — H&P
Hospital Medicine History & Physical      PCP: No primary care provider on file. Date of Admission: 11/30/2021    Date of Service: Pt seen/examined on November 30, 2021 and Admitted to Inpatient     Chief Complaint: Persistent worsening abdominal pain      History Of Present Illness: The patient is a 76 y.o. female with past history of type 2 diabetes, hypertension, previous CAD who presents to [facility] with concern for at least the past few months she has had intermittent left upper quadrant abdominal pain of uncertain etiology, she has been treated for previous issues with back pain by her PCP but apparently this pain seems to be worsening in the last few days especially up until today where it seemed to become unbearable. She notes that she has not recognized any major weight loss for the last few months although she feels as though her weight seems to fluctuate very easily. She denies any recent blood in urine/stool/sputum or any recent vaginal bleeding of concern. She has no previous history of cancer or other diagnoses of cancer recently and she has not had any major abdominal surgeries. She notes she is menopausal and has had some intermittent hot flashes but has not had anything otherwise of note. She denies any recent symptoms of dysuria, fever, chills, dizziness, syncope, chest pain, shortness of breath, leg swelling, rashes, focal weakness, focal vision changes. Past Medical History:    History reviewed. No pertinent past medical history. Past Surgical History:    History reviewed. No pertinent surgical history. Medications Prior to Admission:    Prior to Admission medications    Medication Sig Start Date End Date Taking?  Authorizing Provider   albuterol sulfate HFA (VENTOLIN HFA) 108 (90 Base) MCG/ACT inhaler Inhale 2 puffs into the lungs every 6 hours as needed for Wheezing   Yes Historical Provider, MD   aspirin 81 MG EC tablet Take 81 mg by mouth daily   Yes Historical Provider, MD   Dulaglutide (TRULICITY) 1.5 ZV/9.9OZ SOPN Inject 1.5 mg into the skin once a week   Yes Historical Provider, MD   insulin glargine (LANTUS) 100 UNIT/ML injection vial Inject 20 Units into the skin nightly as needed Takes if BG >150   Yes Historical Provider, MD   lisinopril (PRINIVIL;ZESTRIL) 40 MG tablet Take 40 mg by mouth daily   Yes Historical Provider, MD   Multiple Vitamins-Minerals (THERAPEUTIC MULTIVITAMIN-MINERALS) tablet Take 1 tablet by mouth daily   Yes Historical Provider, MD   polyethylene glycol (GLYCOLAX) 17 g packet Take 17 g by mouth daily as needed for Constipation   Yes Historical Provider, MD   amLODIPine (NORVASC) 10 MG tablet Take 10 mg by mouth daily   Yes Historical Provider, MD   metFORMIN (GLUCOPHAGE) 1000 MG tablet Take 1,000 mg by mouth 2 times daily (with meals)    Historical Provider, MD   atorvastatin (LIPITOR) 40 MG tablet Take 40 mg by mouth daily    Historical Provider, MD   metoprolol succinate (TOPROL XL) 25 MG extended release tablet Take 25 mg by mouth daily    Historical Provider, MD       Allergies:  Patient has no known allergies. Social History:  The patient currently lives home    TOBACCO:   reports that she has been smoking. She uses smokeless tobacco.  ETOH:   reports current alcohol use. Family History:  Reviewed in detail and negative for DM, Early CAD, Cancer, CVA. Positive as follows:    History reviewed. No pertinent family history. REVIEW OF SYSTEMS:   and as noted in the HPI. All other systems reviewed and negative.     PHYSICAL EXAM:    BP (!) 157/75   Pulse 92   Temp 98.2 °F (36.8 °C) (Oral)   Resp 18   Ht 5' 2.5\" (1.588 m)   Wt 141 lb 15.6 oz (64.4 kg)   SpO2 92%   BMI 25.55 kg/m²     General appearance: Fatigued appearing, no acute respiratory stress, alert and oriented x4, in mild cystic lesion may have wall thickening and arises   from the left ovary, suggesting the possibility of a left ovarian neoplasm. Recommend further evaluation with sonography on a nonemergent basis as below.       RECOMMENDATIONS:   Probable benign adenoma. Recommend 1 year follow up adrenal washout CT. If   stable ? 1 year, no further follow-up imaging. Note: This recommendation does   not apply to patients younger than 18 years, patients with cancer, and   patients with any clinical suspicion of a functioning adrenal lesion. References: JACR 2017 Aug; 14(8):1038-44, JCAT 2016 Mar-Apr; 40(2):194-200.       Prompt US recommended. Prompt US recommended. Note: This recommendation   excludes cysts stable ? 2 years, cysts previously characterized by US or MR,   corpus luteum cysts and adnexal calcifications without associated soft tissue   mass. This recommendation does not apply to premenarchal patients and to   those with increased risk (genetic, family history, elevated tumor markers or   other high-risk factors) of ovarian cancer. Reference: JACR 2020 Feb;   17(2):248-254         CBC   Recent Labs     11/30/21  1304   WBC 8.7   HGB 13.0   HCT 40.9         RENAL  Recent Labs     11/30/21  1304      K 4.3      CO2 20*   BUN 14   CREATININE 0.7     LFT'S  Recent Labs     11/30/21  1304   AST 14*   ALT 15   BILITOT 0.3   ALKPHOS 64     COAG  No results for input(s): INR in the last 72 hours.   CARDIAC ENZYMES  Recent Labs     11/30/21  1756   TROPONINI <0.01       U/A:    Lab Results   Component Value Date    COLORU YELLOW 11/30/2021    WBCUA 3 11/30/2021    RBCUA 2 11/30/2021    BACTERIA 1+ 11/30/2021    CLARITYU Clear 11/30/2021    SPECGRAV 1.023 11/30/2021    LEUKOCYTESUR Negative 11/30/2021    BLOODU Negative 11/30/2021    GLUCOSEU 100 11/30/2021       ABG  No results found for: VOJ5TVE, BEART, X1FWDUCT, PHART, THGBART, PUL3GHF, PO2ART, UPY8FKD        Active Hospital Problems    Diagnosis Date Noted    Abdominal pain [R10.9] 11/30/2021         PHYSICIANS CERTIFICATION:    I certify that Justin Cantor is expected to be hospitalized for greater than 2 midnights based on the following assessment and plan:      ASSESSMENT/PLAN:  · Although patient CT findings demonstrate ovarian lesions as well as some lesions in the kidney and pelvis, patient's abdominal pain seems to be focused to the left upper quadrant and does not seem to be consistent with the ovarian lesions, some concern for some dilation of the main pancreatic duct but there does not seem to be any obstructive process according to the CT scan. Patient abdominal pain is of unclear etiology  · Ultrasound of the pelvis was obtained to further evaluate the ovary and uterine area for this CT scan lesion  · MRI of the abdomen with and without contrast is ordered to further evaluate the upper abdomen and question to look for other etiologies of abdominal pain  · Oncology consulted for new finding of abdominal pain with these lesions noted on CAT scan  · N.p.o. for now until evaluation by oncology and obtaining MRI of the abdomen  · Repeat labs in the morning  · Oxycodone 2.5 and 5 mg as needed on a pain scale as well as Dilaudid for breakthrough pain    DVT Prophylaxis: Lovenox  Diet: Diet NPO Exceptions are: Ice Chips, Sips of Water with Meds  Code Status: Full Code  PT/OT Eval Status: Ambulatory    Dispo -pending clinical course       Narinder Lowery Plan, DO    Thank you No primary care provider on file. for the opportunity to be involved in this patient's care. If you have any questions or concerns please feel free to contact me at [phone].

## 2021-12-01 NOTE — ED NOTES
Pt transferred at this time, stated she wants to eat lunch in her \"new room\" pt to 3130 with lunch tray blood sugar was [facility]  12/01/21 1367

## 2021-12-01 NOTE — PROGRESS NOTES
4 Eyes Admission Assessment     I agree as the admission nurse that 2 RN's have performed a thorough Head to Toe Skin Assessment on the patient. ALL assessment sites listed below have been assessed on admission. Areas assessed by both nurses: yes  [x]   Head, Face, and Ears   [x]   Shoulders, Back, and Chest  [x]   Arms, Elbows, and Hands   [x]   Coccyx, Sacrum, and Ischum  [x]   Legs, Feet, and Heels        Does the Patient have Skin Breakdown?   No         [place] Prevention initiated:  No   Wound Care Orders initiated:  No      [facility] nurse consulted for Pressure Injury (Stage 3,4, Unstageable, DTI, NWPT, and Complex wounds):  No      Nurse 1 eSignature:  Electronically signed by Melisa Nelson RN on 12/1/2021       **SHARE this note so that the co-signing nurse is able to place an eSignature**    Nurse 2 eSignature: Electronically signed by Ilene Terry RN on 12/1/21 at 6:50 PM EST

## 2021-12-01 NOTE — PROGRESS NOTES
Pharmacy Medication Reconciliation Note     List of medications Mony Calderon is currently taking is complete. Source of information:   1. Conversation with patient at bedside  2. EMR    Notes regarding home medications:   1. Patient is non-compliant with PCP's medications--reports she quit taking Toprol, atorvastatin, Lyrica, spironolactone, and calcium months ago. 2. Reports she only takes Lantus is BG >150--around 2-3x weekly  3. Typically reports injecting Trulicity 1.5 mg Q Tuesday--not taken today    Patient denies taking any other OTC or herbal medications other than occasional APAP.     Malcolm Hinson, Pharmacy Intern  11/30/2021  7:52 PM

## 2021-12-01 NOTE — ED NOTES
Report called to  Sis Pickett RN   To Room   3130  Cardiac monitor on during transfer  Pt's pain level   Yes  9/10  VSS, Afebrile   IV site is clean, dry and intact, Normal saline locked   Family updated on transfer            Lui Friedman RN  12/01/21 0749

## 2021-12-01 NOTE — PROGRESS NOTES
Checking on patient Q2H for nutrition needs, hygiene needs, comfort measures, mobility, fall risk interventions, and safe environment. All precautions and interventions in place. Educated patient on use of call light and telephone. Patient verbalizes understanding. Call light/telephone in reach.   Electronically signed by Eri Rushing RN on 12/1/2021 at 1:18 PM

## 2021-12-01 NOTE — ED NOTES
Pt just returned from [address]; Main stated that the pt is very lethargic, speech is delayed. Unable to visual the uterus d/t gas. He stated they will re-attempt the US tomorrow.        Lorie Chance, RAUL  11/30/21 Paty Guerrero RN  11/30/21 4945

## 2021-12-01 NOTE — PROGRESS NOTES
Patient A&O. Tolerating PO. Call light within reach. Will continue to monitor and reassess.  Electronically signed by Cindy Woodard RN on 12/1/2021 1.89

## 2021-12-01 NOTE — PROGRESS NOTES
Received consult via Dr. Haydee Alcaraz. Called the ER and I was transferred to the PA who transferred to the RN. Spoke to RN while pt was in the ER and I overheard the nurse ask if I could just read the chart. Unfortunately, at that time I was not logged into Epic as I was in my office. RN agreed to tell me what was going on with the patient. Based on the information. I would like pt to have better imaging of the gyn structures. A transvaginal u/s was not completed. Notes say pt could not be consented. Pt does not recall being asked but did have narcotics    I have reordered. ca125 ordered. Pt may eat from gyn standpoint. Pt states she has had no vaginal bleeding. She states she was admitted for left upper abdominal pain wrapping to her back. Dene[facility] C[facility] Reyes Everts, [address] [place] [address] Oncology  [phone] ([phone])

## 2021-12-01 NOTE — PROGRESS NOTES
Hospitalist Progress Note      PCP: No primary care provider on file. Date of Admission: 11/30/2021    Chief Complaint:   [place] Radha pain    Hospital Course: The patient is a 76 y.o. female with past history of type 2 diabetes, hypertension, previous CAD who presents to [facility] with concern for at least the past few months she has had intermittent left upper quadrant abdominal pain of uncertain etiology, she has been treated for previous issues with back pain by her PCP but apparently this pain seems to be worsening in the last few days especially up until today where it seemed to become unbearable. She notes that she has not recognized any major weight loss for the last few months although she feels as though her weight seems to fluctuate very easily. She denies any recent blood in urine/stool/sputum or any recent vaginal bleeding of concern. She has no previous history of cancer or other diagnoses of cancer recently and she has not had any major abdominal surgeries. She notes she is menopausal and has had some intermittent hot flashes but has not had anything otherwise of note. She denies any recent symptoms of dysuria, fever, chills, dizziness, syncope, chest pain, shortness of breath, leg swelling, rashes, focal weakness, focal vision changes.     Subjective:   Left lateral quadrant abdo pain  No N/V/D  No F/C      Medications:  Reviewed    Infusion Medications    sodium chloride      dextrose       Scheduled Medications    HYDROmorphone  1 mg IntraVENous Once    sodium chloride flush  5-40 mL IntraVENous 2 times per day    enoxaparin  40 mg SubCUTAneous Daily    insulin lispro  0-6 Units SubCUTAneous TID WC    insulin lispro  0-3 Units SubCUTAneous Nightly    aspirin  81 mg Oral Daily    amLODIPine  10 mg Oral Daily    metoprolol succinate  25 mg Oral Daily     PRN Meds: HYDROmorphone, sodium chloride flush, sodium chloride, naloxone, ondansetron, glucose, dextrose, glucagon (rDNA), dextrose, oxyCODONE **OR** oxyCODONE    No intake or output data in the 24 hours ending 12/01/21 0955    Physical Exam Performed:    BP (!) 157/75   Pulse 92   Temp 98.2 °F (36.8 °C) (Oral)   Resp 18   Ht 5' 2.5\" (1.588 m)   Wt 141 lb 15.6 oz (64.4 kg)   SpO2 92%   BMI 25.55 kg/m²     General appearance: Anxious+, appears stated age and cooperative. HEENT: Pupils equal, round, and reactive to light. Conjunctivae/corneas clear. Neck: Supple, with full range of motion. No jugular venous distention. Trachea midline. Respiratory:  Normal respiratory effort. Clear to auscultation, bilaterally without Rales/Wheezes/Rhonchi. Cardiovascular: Regular rate and rhythm with normal S1/S2 without murmurs, rubs or gallops. Abdomen: Soft, non-tender, non-distended with normal bowel sounds. Musculoskeletal: No clubbing, cyanosis or edema bilaterally. Full range of motion without deformity. Skin: Skin color, texture, turgor normal.  No rashes or lesions. Neurologic:  Neurovascularly intact without any focal sensory/motor deficits. Cranial nerves: II-XII intact, grossly non-focal.  Psychiatric: Alert and oriented, thought content appropriate, normal insight  Capillary Refill: Brisk,3 seconds, normal   Peripheral Pulses: +2 palpable, equal bilaterally       Labs:   Recent Labs     11/30/21  1304 12/01/21  0817   WBC 8.7 8.9   HGB 13.0 12.2   HCT 40.9 39.3    175     Recent Labs     11/30/21  1304 12/01/21  0817    139   K 4.3 4.0    105   CO2 20* 23   BUN 14 10   CREATININE 0.7 0.5*   CALCIUM 9.8 8.8     Recent Labs     11/30/21  1304 12/01/21  0817   AST 14* 13*   ALT 15 13   BILITOT 0.3 0.4   ALKPHOS 64 57     No results for input(s): INR in the last 72 hours.   Recent Labs     11/30/21  1756   TROPONINI <0.01       Urinalysis:      Lab Results   Component Value Date    NITRU Negative 11/30/2021    WBCUA 3 11/30/2021    BACTERIA 1+ 11/30/2021    RBCUA 2 11/30/2021    BLOODU Negative 11/30/2021 excludes cysts stable ? 2 years, cysts previously characterized by US or MR,   corpus luteum cysts and adnexal calcifications without associated soft tissue   mass. This recommendation does not apply to premenarchal patients and to   those with increased risk (genetic, family history, elevated tumor markers or   other high-risk factors) of ovarian cancer. Reference: JACR 2020 Feb;   17(2):248-254         MRI ABDOMEN W WO CONTRAST    (Results Pending)           ASSESSMENT/PLAN  1. Adrenal mass-right;  2. Left adnexial cyst lesion- to r/o malignancy  3.  Left kidney upper pole lesion- r/o malignancy    -Had CT abdo/pelvis  -US abdo/pelvis  -await MRI abdo/pelvis  -Urology, oncology and gyne consults after MRI       DVT Prophylaxis: Lovenox  Diet: Diet NPO Exceptions are: Ice Chips, Sips of Water with Meds  Code Status: Full Code  PT/OT Eval Status: Ambulatory     Dispo -pending clinical course    Nersisa Mistry MD

## 2021-12-02 ENCOUNTER — APPOINTMENT (OUTPATIENT)
Dept: MRI IMAGING | Age: 66
DRG: 468 | End: 2021-12-02
Payer: MEDICAID

## 2021-12-02 VITALS
SYSTOLIC BLOOD PRESSURE: 162 MMHG | HEIGHT: 63 IN | WEIGHT: 141.98 LBS | BODY MASS INDEX: 25.16 KG/M2 | HEART RATE: 71 BPM | TEMPERATURE: 98.3 F | DIASTOLIC BLOOD PRESSURE: 87 MMHG | OXYGEN SATURATION: 93 % | RESPIRATION RATE: 16 BRPM

## 2021-12-02 PROBLEM — F32.2 SEVERE SINGLE CURRENT EPISODE OF MAJOR DEPRESSIVE DISORDER, WITHOUT PSYCHOTIC FEATURES (HCC): Status: ACTIVE | Noted: 2017-01-30

## 2021-12-02 PROBLEM — M51.34 DDD (DEGENERATIVE DISC DISEASE), THORACIC: Status: ACTIVE | Noted: 2021-02-04

## 2021-12-02 PROBLEM — M43.17 SPONDYLOLISTHESIS AT L5-S1 LEVEL: Status: ACTIVE | Noted: 2021-02-04

## 2021-12-02 PROBLEM — M20.012 MALLET FINGER OF LEFT HAND: Status: ACTIVE | Noted: 2018-07-30

## 2021-12-02 PROBLEM — H26.9 CORTICAL CATARACT OF BOTH EYES: Status: ACTIVE | Noted: 2019-05-13

## 2021-12-02 PROBLEM — E05.90 HYPERTHYROIDISM: Status: ACTIVE | Noted: 2017-09-07

## 2021-12-02 PROBLEM — I35.1 AORTIC VALVE REGURGITATION: Status: ACTIVE | Noted: 2017-11-02

## 2021-12-02 PROBLEM — M54.17 THORACIC AND LUMBOSACRAL NEURITIS: Status: ACTIVE | Noted: 2017-09-05

## 2021-12-02 PROBLEM — M54.14 THORACIC AND LUMBOSACRAL NEURITIS: Status: ACTIVE | Noted: 2017-09-05

## 2021-12-02 PROBLEM — I51.89 DIASTOLIC DYSFUNCTION: Status: ACTIVE | Noted: 2017-11-02

## 2021-12-02 LAB
A/G RATIO: 1.3 (ref 1.1–2.2)
ALBUMIN SERPL-MCNC: 3.8 G/DL (ref 3.4–5)
ALP BLD-CCNC: 62 U/L (ref 40–129)
ALT SERPL-CCNC: 14 U/L (ref 10–40)
ANION GAP SERPL CALCULATED.3IONS-SCNC: 11 MMOL/L (ref 3–16)
AST SERPL-CCNC: 12 U/L (ref 15–37)
BASOPHILS ABSOLUTE: 0 K/UL (ref 0–0.2)
BASOPHILS RELATIVE PERCENT: 0.4 %
BILIRUB SERPL-MCNC: <0.2 MG/DL (ref 0–1)
BUN BLDV-MCNC: 12 MG/DL (ref 7–20)
CALCIUM SERPL-MCNC: 9 MG/DL (ref 8.3–10.6)
CHLORIDE BLD-SCNC: 103 MMOL/L (ref 99–110)
CO2: 21 MMOL/L (ref 21–32)
CREAT SERPL-MCNC: 0.6 MG/DL (ref 0.6–1.2)
EOSINOPHILS ABSOLUTE: 0.2 K/UL (ref 0–0.6)
EOSINOPHILS RELATIVE PERCENT: 2.7 %
GFR AFRICAN AMERICAN: >60
GFR NON-AFRICAN AMERICAN: >60
GLUCOSE BLD-MCNC: 148 MG/DL (ref 70–99)
GLUCOSE BLD-MCNC: 182 MG/DL (ref 70–99)
GLUCOSE BLD-MCNC: 194 MG/DL (ref 70–99)
GLUCOSE BLD-MCNC: 224 MG/DL (ref 70–99)
HCT VFR BLD CALC: 39.6 % (ref 36–48)
HEMOGLOBIN: 12.3 G/DL (ref 12–16)
LYMPHOCYTES ABSOLUTE: 3.9 K/UL (ref 1–5.1)
LYMPHOCYTES RELATIVE PERCENT: 44.2 %
MAGNESIUM: 2 MG/DL (ref 1.8–2.4)
MCH RBC QN AUTO: 22.7 PG (ref 26–34)
MCHC RBC AUTO-ENTMCNC: 31.2 G/DL (ref 31–36)
MCV RBC AUTO: 72.8 FL (ref 80–100)
MONOCYTES ABSOLUTE: 0.7 K/UL (ref 0–1.3)
MONOCYTES RELATIVE PERCENT: 8.4 %
NEUTROPHILS ABSOLUTE: 3.9 K/UL (ref 1.7–7.7)
NEUTROPHILS RELATIVE PERCENT: 44.3 %
PDW BLD-RTO: 15 % (ref 12.4–15.4)
PERFORMED ON: ABNORMAL
PLATELET # BLD: 183 K/UL (ref 135–450)
PMV BLD AUTO: 9.7 FL (ref 5–10.5)
POTASSIUM SERPL-SCNC: 4.2 MMOL/L (ref 3.5–5.1)
RBC # BLD: 5.44 M/UL (ref 4–5.2)
SODIUM BLD-SCNC: 135 MMOL/L (ref 136–145)
TOTAL PROTEIN: 6.8 G/DL (ref 6.4–8.2)
WBC # BLD: 8.8 K/UL (ref 4–11)

## 2021-12-02 PROCEDURE — 2580000003 HC RX 258: Performed by: STUDENT IN AN ORGANIZED HEALTH CARE EDUCATION/TRAINING PROGRAM

## 2021-12-02 PROCEDURE — 85025 COMPLETE CBC W/AUTO DIFF WBC: CPT

## 2021-12-02 PROCEDURE — 6360000002 HC RX W HCPCS: Performed by: STUDENT IN AN ORGANIZED HEALTH CARE EDUCATION/TRAINING PROGRAM

## 2021-12-02 PROCEDURE — 6370000000 HC RX 637 (ALT 250 FOR IP): Performed by: STUDENT IN AN ORGANIZED HEALTH CARE EDUCATION/TRAINING PROGRAM

## 2021-12-02 PROCEDURE — 36415 COLL VENOUS BLD VENIPUNCTURE: CPT

## 2021-12-02 PROCEDURE — 6370000000 HC RX 637 (ALT 250 FOR IP): Performed by: INTERNAL MEDICINE

## 2021-12-02 PROCEDURE — 83735 ASSAY OF MAGNESIUM: CPT

## 2021-12-02 PROCEDURE — 94760 N-INVAS EAR/PLS OXIMETRY 1: CPT

## 2021-12-02 PROCEDURE — 80053 COMPREHEN METABOLIC PANEL: CPT

## 2021-12-02 RX ORDER — LISINOPRIL 40 MG/1
40 TABLET ORAL DAILY
Status: DISCONTINUED | OUTPATIENT
Start: 2021-12-02 | End: 2021-12-02 | Stop reason: HOSPADM

## 2021-12-02 RX ORDER — LORAZEPAM 0.5 MG/1
0.5 TABLET ORAL ONCE
Status: COMPLETED | OUTPATIENT
Start: 2021-12-02 | End: 2021-12-02

## 2021-12-02 RX ORDER — HYDRALAZINE HYDROCHLORIDE 20 MG/ML
10 INJECTION INTRAMUSCULAR; INTRAVENOUS EVERY 4 HOURS PRN
Status: DISCONTINUED | OUTPATIENT
Start: 2021-12-02 | End: 2021-12-02 | Stop reason: HOSPADM

## 2021-12-02 RX ADMIN — AMLODIPINE BESYLATE 10 MG: 5 TABLET ORAL at 12:26

## 2021-12-02 RX ADMIN — LISINOPRIL 40 MG: 40 TABLET ORAL at 12:27

## 2021-12-02 RX ADMIN — SODIUM CHLORIDE, PRESERVATIVE FREE 10 ML: 5 INJECTION INTRAVENOUS at 09:40

## 2021-12-02 RX ADMIN — INSULIN LISPRO 1 UNITS: 100 INJECTION, SOLUTION INTRAVENOUS; SUBCUTANEOUS at 07:49

## 2021-12-02 RX ADMIN — ENOXAPARIN SODIUM 40 MG: 40 INJECTION SUBCUTANEOUS at 07:41

## 2021-12-02 RX ADMIN — LORAZEPAM 0.5 MG: 0.5 TABLET ORAL at 14:23

## 2021-12-02 RX ADMIN — OXYCODONE 5 MG: 5 TABLET ORAL at 07:41

## 2021-12-02 RX ADMIN — INSULIN LISPRO 2 UNITS: 100 INJECTION, SOLUTION INTRAVENOUS; SUBCUTANEOUS at 12:27

## 2021-12-02 ASSESSMENT — PAIN - FUNCTIONAL ASSESSMENT
PAIN_FUNCTIONAL_ASSESSMENT: PREVENTS OR INTERFERES SOME ACTIVE ACTIVITIES AND ADLS
PAIN_FUNCTIONAL_ASSESSMENT: PREVENTS OR INTERFERES SOME ACTIVE ACTIVITIES AND ADLS

## 2021-12-02 ASSESSMENT — PAIN DESCRIPTION - DESCRIPTORS
DESCRIPTORS: HEADACHE
DESCRIPTORS: HEADACHE

## 2021-12-02 ASSESSMENT — PAIN DESCRIPTION - ORIENTATION
ORIENTATION: MID;ANTERIOR
ORIENTATION: MID;ANTERIOR

## 2021-12-02 ASSESSMENT — PAIN SCALES - GENERAL
PAINLEVEL_OUTOF10: 7
PAINLEVEL_OUTOF10: 6

## 2021-12-02 ASSESSMENT — PAIN DESCRIPTION - FREQUENCY
FREQUENCY: CONTINUOUS
FREQUENCY: CONTINUOUS

## 2021-12-02 ASSESSMENT — PAIN DESCRIPTION - LOCATION
LOCATION: HEAD
LOCATION: HEAD

## 2021-12-02 ASSESSMENT — PAIN DESCRIPTION - ONSET
ONSET: ON-GOING
ONSET: ON-GOING

## 2021-12-02 ASSESSMENT — PAIN DESCRIPTION - PROGRESSION
CLINICAL_PROGRESSION: GRADUALLY WORSENING
CLINICAL_PROGRESSION: NOT CHANGED

## 2021-12-02 ASSESSMENT — PAIN DESCRIPTION - PAIN TYPE
TYPE: ACUTE PAIN
TYPE: ACUTE PAIN

## 2021-12-02 NOTE — PROGRESS NOTES
Patient resting in bed this morning c/o headache. Patient states that she has a headache r/t NPO status for MRI. Patient educated on the importance of maintaining NPO status until abdominal MRI is completed this afternoon. Patient became very angry, states that \"no one understands why she is here\". Patient states she is not having abdominal pain, states here pain is \"right here\" (pointed to LUQ/ L rib cage). Patient educated that the LUQ is considered a part of the abdomen, patient unhappy w/ this information, states this RN is wrong. Scheduled morning medications attempted to be administered. Patient refusing to take amlodipine as her home lisinopril is not ordered. Patient sates she cannot take one without the other despite being hypertensive at this time. Message sent to MD Rony Mancilla regarding patient request for Lisinopril to be added. Message read, no response at this time. This RN spoke w/ Inna Lizarraga in MRI to obtain time for patient's abdominal MRI. Inna Lizarraga states that patient is on the schedule for this afternoon, exact time not provided. Patient states if her MRI is not completed within the next 10 minutes she's going to go ahead and eat because she's \"not used to being hungry\". This RN contacted Inna Lizarraga again stating patient is going to eat breakfast. Inna Lizarraga states that if patient eats breakfast but is NPO the rest of the day she can have the MRI completed around 1700 today. Patient's new primary RN Amelie hSah alerted (this RN fired by patient). Patient denies needs from this RN, requested this RN leave patient's room.

## 2021-12-02 NOTE — CONSULTS
GYNECOLOGIC ONCOLOGY CONSULTATION:     2021 3:22 PM    REASON FOR CONSULT: adnexal mass    REFERRING PROVIDER:  No care team member to display  PCP: No primary care provider on file. CHIEF COMPLAINT:     Chief Complaint   Patient presents with    Abdominal Pain     upper left quad, difficult to have BM. HISTORY OF PRESENT ILLNESS:     HPI: This is a 78 y.o.  female with past medical history of HTN, T2DM and CAD who presented to the hospital with complaints of pain under her left breast that wraps around to her back for the last month. The pain acutely worsened. The pain medication she is receiving in the hospital does improve the pain but it is not resolved. Reports pain is currently 7/10. The pain is constant. She is unaware of any aggravating or alleviating factors. Denies trauma to the area. Denies shortness of breath or pain in her left arm. She reports fatty tissue in her left breast that she has had. She did have a second mammogram this year due to the pain that was normal. She is upset because she is being worked up for [employer] pain\" but she is adamant that her pain is not in her abdomen. She wants imaging of her chest and wants someone to evaluate that so she can go home. She refuses to have TVUS because she does not want the probe in her vagina. She reports bloating and constipation. Reports that she has all her pelvic organs. She does report history of ectopic but unwilling to discuss further. She sees [facility] gynecologist yearly and doesn't understand why they had not found the cyst if they were doing regular exams. She wants to find a new gynecologist. Denies history of abnormal pap smears. Reports regular monthly periods until age 57 and no vaginal bleeding since that time. Denies family history of breast, uterine, ovarian cancer. Reports paternal cousin  of colon ca but did not want to discuss further history. Oncology History    No history exists.        PAST MEDICAL HISTORY:     Past Medical History:   Diagnosis Date    Hypertension     Type 2 diabetes mellitus ([facility] [address]        ROS:   Review of Systems - see HPI    PAST SURGICAL HISTORY:      History reviewed. No pertinent surgical history. OB/GYN HISTORY:   Menstrual History:  OB History    No obstetric history on file. No LMP recorded. Patient is postmenopausal.     SOCIAL HISTORY:     Social History     Socioeconomic History    Marital status: Single     Spouse name: Not on file    Number of children: Not on file    Years of education: Not on file    Highest education level: Not on file   Occupational History    Not on file   Tobacco Use    Smoking status: Current Some Day Smoker    Smokeless tobacco: Current User   Vaping Use    Vaping Use: Never used   Substance and Sexual Activity    Alcohol use: Yes    Drug use: Yes    Sexual activity: Not Currently   Other Topics Concern    Not on file   Social History Narrative    Not on file     Social Determinants of Health     Financial Resource Strain:     Difficulty of Paying Living Expenses: Not on file   Food Insecurity:     Worried About Running Out of Food in the Last Year: Not on file    Saran of Food in the Last Year: Not on file   Transportation Needs:     Lack of Transportation (Medical): Not on file    Lack of Transportation (Non-Medical):  Not on file   Physical Activity:     Days of Exercise per Week: Not on file    Minutes of Exercise per Session: Not on file   Stress:     Feeling of Stress : Not on file   Social Connections:     Frequency of Communication with Friends and Family: Not on file    Frequency of Social Gatherings with Friends and Family: Not on file    Attends Denominational Services: Not on file    Active Member of Clubs or Organizations: Not on file    Attends Club or Organization Meetings: Not on file    Marital Status: Not on file   Intimate Partner Violence:     Fear of Current or Ex-Partner: Not on file   Wendy Hendricks Emotionally Abused: Not on file    Physically Abused: Not on file    Sexually Abused: Not on file   Housing Stability:     Unable to Pay for Housing in the Last Year: Not on file    Number of Places Lived in the Last Year: Not on file    Unstable Housing in the Last Year: Not on file       FAMILY HISTORY:     Family History   Problem Relation Age of Onset    Colon Cancer Paternal Cousin     Breast Cancer Neg Hx     Uterine Cancer Neg Hx     Ovarian Cancer Neg Hx      HEALTH MAINTENANCE:   Last Pap: unknown  MMG History: per patient normal this year  Colonoscopy: unknown  DEXA scan: unknown    ALLERGIES:     Allergies as of 11/30/2021    (No Known Allergies)       MEDICATIONS:     No current facility-administered medications on file prior to encounter.      Current Outpatient Medications on File Prior to Encounter   Medication Sig Dispense Refill    albuterol sulfate HFA (VENTOLIN HFA) 108 (90 Base) MCG/ACT inhaler Inhale 2 puffs into the lungs every 6 hours as needed for Wheezing      aspirin 81 MG EC tablet Take 81 mg by mouth daily      Dulaglutide (TRULICITY) 1.5 CT/4.0CS SOPN Inject 1.5 mg into the skin once a week      insulin glargine (LANTUS) 100 UNIT/ML injection vial Inject 20 Units into the skin nightly as needed Takes if BG >150      lisinopril (PRINIVIL;ZESTRIL) 40 MG tablet Take 40 mg by mouth daily      Multiple Vitamins-Minerals (THERAPEUTIC MULTIVITAMIN-MINERALS) tablet Take 1 tablet by mouth daily      polyethylene glycol (GLYCOLAX) 17 g packet Take 17 g by mouth daily as needed for Constipation      amLODIPine (NORVASC) 10 MG tablet Take 10 mg by mouth daily      metFORMIN (GLUCOPHAGE) 1000 MG tablet Take 1,000 mg by mouth 2 times daily (with meals)      atorvastatin (LIPITOR) 40 MG tablet Take 40 mg by mouth daily      metoprolol succinate (TOPROL XL) 25 MG extended release tablet Take 25 mg by mouth daily         PHYSICAL EXAM:       BP (!) 162/87   Pulse 71   Temp 98.3 °F (36.8 °C) (Oral)   Resp 16   Ht 5' 2.5\" (1.588 m)   Wt 141 lb 15.6 oz (64.4 kg)   SpO2 93%   BMI 25.55 kg/m²     General appearance: alert and cooperative  Head: Normocephalic, without obvious abnormality, atraumatic  Breasts: deferred  Lungs: normal respiratory effort, chest wall without point tenderness  Heart: no peripheral edema  Abdominal exam: mild distension, non tender, no palpable masses  Pelvic exam: deferred  Extremities: without cyanosis, clubbing, edema or asymmetry  Skin: No jaundice, purpura or petechiae      LABS:     Lab Results   Component Value Date     11.0 12/01/2021       Lab Results   Component Value Date    CEA 5.4 (H) 12/01/2021       No results found for:     Lab Results   Component Value Date    WBC 8.8 12/02/2021    HGB 12.3 12/02/2021    HCT 39.6 12/02/2021    MCV 72.8 (L) 12/02/2021     12/02/2021       Lab Results   Component Value Date    GLUCOSE 194 (H) 12/02/2021    BUN 12 12/02/2021    CREATININE 0.6 12/02/2021    K 4.2 12/02/2021       Lab Results   Component Value Date    ALKPHOS 62 12/02/2021    AST 12 (L) 12/02/2021       Lab Results   Component Value Date    MG 2.00 12/02/2021       No results found for: URICACID    No results found for: LDH    No components found for: PT,  INR    No results found for: IRON, TIBC, FERRITIN    IMAGING:       US PELVIS COMPLETE  Narrative: EXAMINATION:  PELVIC ULTRASOUND    11/30/2021    TECHNIQUE:  Transabdominal pelvic ultrasound was performed. COMPARISON:  None. HISTORY:  ORDERING SYSTEM PROVIDED HISTORY: lt adnexal lesion  TECHNOLOGIST PROVIDED HISTORY:    Reason for exam:->lt adnexal lesion    FINDINGS:    Measurements:    Uterus: Not measured. Endometrial stripe: Not measured. Right Ovary:Not measured. Left Ovary: Not measured. Ultrasound Findings:    Uterus: Not visualized. Endometrial stripe: Not visualized. Right Ovary: Not visualized. Left Ovary:  Not visualized.     Free Fluid: No evidence of free fluid. The examination is extremely limited due to overlying bowel gas. Patient was  unable to consent for transvaginal images. Impression: Examination is extremely limited due to overlying bowel gas and inability to  perform transvaginal images. Repeat ultrasound is recommended when patient  is able to consent. The uterus and ovaries were unable to be visualized. EXAMINATION:   CT OF THE ABDOMEN AND PELVIS WITH CONTRAST       11/30/2021 3:41 pm       TECHNIQUE:   CT of the abdomen and pelvis was performed with the administration of   intravenous contrast. Multiplanar reformatted images are provided for review.    Dose modulation, iterative reconstruction, and/or weight based adjustment of   the mA/kV was utilized to reduce the radiation dose to as low as reasonably   achievable.       COMPARISON:   None       HISTORY:   ORDERING SYSTEM PROVIDED HISTORY: abd pain with elevated lipase   TECHNOLOGIST PROVIDED HISTORY:   Reason for exam:->abd pain with elevated lipase   Additional Contrast?->None   Decision Support Exception - unselect if not a suspected or confirmed   emergency medical condition->Emergency Medical Condition (MA)   Reason for Exam: abd pain with elevated lipase   Acuity: Acute   Type of Exam: Initial       FINDINGS:   LOWER CHEST:  Minimal linear opacities in each lung base consistent with   atelectasis or scarring.  Mild cardiomegaly.  No pleural nor pericardial   effusions.       ORGANS:  Hepatic and splenic granulomatous calcifications.  Mild dilation of   the main pancreatic duct with a normal variant accessory duct present.  No   concomitant intrahepatic nor extrahepatic biliary dilation.  Mild to moderate   pancreatic atrophy.  Moderate splenic atrophy.  1.3 cm x 0.9 cm lesion in the   medial limb of the right adrenal gland with homogeneous appearance, smooth   margins, and indeterminate density.  0.9 cm hypodense lesion of indeterminate   density in the upper pole of abdomen CT in 1 year as below.  Of   note, this could also be evaluated on the above recommended MRI. 5. A 4.1 cm left adnexal cystic lesion may have wall thickening and arises   from the left ovary, suggesting the possibility of a left ovarian neoplasm. Recommend further evaluation with sonography on a nonemergent basis as below.       RECOMMENDATIONS:   Probable benign adenoma. Recommend 1 year follow up adrenal washout CT. If   stable ? 1 year, no further follow-up imaging. Note: This recommendation does   not apply to patients younger than 18 years, patients with cancer, and   patients with any clinical suspicion of a functioning adrenal lesion. References: JACR 2017 Aug; 14(8):1038-44, JCAT 2016 Mar-Apr; 40(2):194-200.       Prompt US recommended. Prompt US recommended. Note: This recommendation   excludes cysts stable ? 2 years, cysts previously characterized by US or MR,   corpus luteum cysts and adnexal calcifications without associated soft tissue   mass. This recommendation does not apply to premenarchal patients and to   those with increased risk (genetic, family history, elevated tumor markers or   other high-risk factors) of ovarian cancer. Reference: JACR 2020;   17(2):248-254     ASSESSMENT:   This is a 78 y.o.  female with past medical history of HTN, T2DM and CAD who presented to the hospital with complaints of pain under her left breast that wraps around to her back for the last month. During her initial work up she was found to have incidental adnexal mass for which we are consulted.      Problem List Items Addressed This Visit     None      Visit Diagnoses     Intractable left upper quadrant abdominal pain    -  Primary    Abnormal finding on CT scan        0.9 cm left kidney lesion-solid neoplasm or complicated cyst; right adrenal gland lesion measuring 1.3 cm-recommend follow-up with adrenal protocol in 1 year; 4.1 cm left adnexal cystic lesion suggesting possibility of left ovarian neoplasm    Elevated lipase        Intractable pain        Left flank        PLAN:     - Ca 125 normal, CEA mildly elevated at 5.4, CA 19-9 pending  - recommended TVUS but patient uncomfortable with exam, not urgent, we can reassess outpatient  - MRI abdomen pending  - discussed bowel regimen for constipation  - Patient will follow up outpatient for further work up and management as indicated for pelvic mass    Patient seen and discussed with Dr. Yajaira Rosales. Jackson Chou MD    I agree with the above. The patient has been seen and examined by me. Pt is most concerned with her ongoing RUQ pain. MRI is planned for today. She is declining a gyn exam.  She does have a gynecologist but she does not recall when she was last evaluated. Unfortunately, pt is limiting our ability to assist her. We will f/u on the labwork and additional imaging. If still declining gyn exam will opt for outpt eval.     Christian Rosales, [address] [place] [address] Oncology  [phone])

## 2021-12-02 NOTE — PLAN OF CARE
Problem: Pain:  Goal: Pain level will decrease  Description: Pain level will decrease  12/2/2021 0857 by Leroy Good RN  Outcome: Ongoing  Note: Pain managed with pharmacologic and non-pharmacologic interventions during this shift. Will continue to monitor and assess for needs and change in patient condition. 12/2/2021 0015 by Adria Dubon RN  Outcome: Ongoing  Note: Patients pain level will decrease  Goal: Control of acute pain  Description: Control of acute pain  12/2/2021 0857 by Leroy Good RN  Outcome: Ongoing  Note: Pain managed with pharmacologic and non-pharmacologic interventions during this shift. Will continue to monitor and assess for needs and change in patient condition. 12/2/2021 0015 by Adria Dubon RN  Outcome: Ongoing  Note: Patient will have control of acute pain  Goal: Control of chronic pain  Description: Control of chronic pain  12/2/2021 0857 by Leroy Good RN  Outcome: Ongoing  Note: Pain managed with pharmacologic and non-pharmacologic interventions during this shift. Will continue to monitor and assess for needs and change in patient condition. 12/2/2021 0015 by Adria Dubon RN  Outcome: Ongoing  Note: Patient will have control of chronic pain     Problem: Falls - Risk of:  Goal: Will remain free from falls  Description: Will remain free from falls  12/2/2021 0857 by Leroy Good RN  Outcome: Ongoing  Note: Patient remains free from falls during this shift. Fall precautions remain in place. Patient educated on the need to implement call light use prior to ambulation. Will continue to monitor and assess.     12/2/2021 0015 by Adria Dubon RN  Outcome: Ongoing  Note: Patient will remain free from falls     Problem: Skin Integrity:  Goal: Will show no infection signs and symptoms  Description: Will show no infection signs and symptoms  12/2/2021 0857 by Leroy Good RN  Outcome: Ongoing  Note: Patient remains free from new signs and symptoms of infection during this shift. Infection prevention measures are in place. Will continue to monitor for alterations in patient condition throughout the shift. 12/2/2021 0015 by Landon Persaud RN  Outcome: Ongoing  Note: Patient will show no signs and symptoms of infection  Goal: Absence of new skin breakdown  Description: Absence of new skin breakdown  12/2/2021 0857 by Joanne Leigh RN  Outcome: Ongoing  Note: Patient skin condition and mucus membrane integrity remain unchanged during this shift. Skin breakdown prevention interventions are in place. Will continue to monitor and assess.      12/2/2021 0015 by Landon Persaud RN  Outcome: Ongoing  Note: Patient will be absent of new skin breakdown

## 2021-12-02 NOTE — PROGRESS NOTES
Patient back to 3W in room 3130. MRI called and said patient stopped MRI with 10 minutes left refusing to lay in the machine any longer. MRI stated they could try to fit her in tomorrow but are short staffed and may not be able to. MRI stated patient was made aware of that and still refused finishing MRI.   Electronically signed by Bao Arora RN on 12/2/2021 at 4:22 PM

## 2021-12-02 NOTE — PROGRESS NOTES
Patient is resting in bed and is alert and oriented x4. Patient finished her breakfast and is educated on not eating lunch or dinner tonight and remaining NPO for abdominal MRI at 1700. Dr. Gilberto Gbison put in order for patient to take her lisinopril. Patient is ambulating independently. All other needs are met at this time and safety precautions are in place. Will continue to monitor and assess.   Electronically signed by Stefani Linton RN on 12/2/2021 at 11:15 AM

## 2021-12-02 NOTE — PROGRESS NOTES
This RN in patients room this am to complete MRI checklist. Lab techs leaving room as this RN enters. Patient visibly upset and states \"If they can't get blood from this (pointing at IV in [facility]) then they aren't getting any. \" Patient noted to have small knot on top of left hand from this mornings blood draw. Provided emotional support to patient and began with MRI check list. This RN stated to patient the reasoning for the MRI and her chief complaint of abdominal pain and patient states, \"I am not having abdominal pain! All you people keep telling me I came here for abdominal pain and I didn't come in for that. I came in for pain under my breast and around to my back. \" Continued with MRI checklist questions and once completed asked patient to sign checklist and she states, \"I want to look over it first.\" Left MRI check list with patient to review. Patient then asking, \"How long does it take to get the results, I need to get out of here. \" Stated to patient pending results can sometimes take a couple hours.

## 2021-12-02 NOTE — PLAN OF CARE
Problem: Pain:  Goal: Pain level will decrease  Description: Pain level will decrease  12/2/2021 0015 by Michelle Pedersen RN  Outcome: Ongoing  Note: Patients pain level will decrease  12/1/2021 1315 by Denny Lopez RN  Outcome: Ongoing  Note: Pain /discomfort being managed with PRN analgesics per MD orders. Patient able to express presence and absence of pain and rate pain appropriately using numerical scale. Goal: Control of acute pain  Description: Control of acute pain  12/2/2021 0015 by Michelle Pedersen RN  Outcome: Ongoing  Note: Patient will have control of acute pain  12/1/2021 1315 by Denny Lopez RN  Outcome: Ongoing  Goal: Control of chronic pain  Description: Control of chronic pain  12/2/2021 0015 by Michelle Pedersen RN  Outcome: Ongoing  Note: Patient will have control of chronic pain  12/1/2021 1315 by Denny Lopez RN  Outcome: Ongoing     Problem: Falls - Risk of:  Goal: Will remain free from falls  Description: Will remain free from falls  12/2/2021 0015 by Michelle Pedersen RN  Outcome: Ongoing  Note: Patient will remain free from falls  12/1/2021 1315 by Denny Lopez RN  Outcome: Ongoing  Note: Fall risk assessment completed . Fall precautions in place, bed alarm on, side rails 2/4 up, call light in reach, educated pt on calling for assistance when needed, room clear of clutter. Pt verbalized understanding.         Problem: Skin Integrity:  Goal: Will show no infection signs and symptoms  Description: Will show no infection signs and symptoms  12/2/2021 0015 by Michelle Pedersen RN  Outcome: Ongoing  Note: Patient will show no signs and symptoms of infection  12/1/2021 1315 by Denny Lopez RN  Outcome: Ongoing  Note: Patient is alert and oriented, afebrile, has manageable complaints of pain, skin is intact and appropriate for ethnicity in color    Goal: Absence of new skin breakdown  Description: Absence of new skin breakdown  12/2/2021 0015 by Zev Buster Sever, RN  Outcome: Ongoing  Note: Patient will be absent of new skin breakdown  12/1/2021 1315 by Dorothy Melgar RN  Outcome: Ongoing  Note: Praveen score assessed. Patient able to ambulate and turn self and repositioned patient Q2H and assessed skin. Educated patient on importance of repositioning to prevent skin issues.

## 2021-12-02 NOTE — PROGRESS NOTES
Patient resting in bed at this time, no complaints this shift, A/Ox4, vital signs stable. Patient asking for Milk of Mag, given with Senna, patient had small BM, charted. Patient aware of NPO status for MRI in am, refused insulin scheduled at bedtime, education given. Will continue to monitor.

## 2021-12-02 NOTE — PROGRESS NOTES
Patient continuously is calling the hospitalist a liar and that she does not want him to care for her or want him in her room again. Patient stated that down in MRI the ladies were using the term \"belly\". Patient stated that she did not like that and said it was prejudice. That they did not use that terminology with the patient in the MRI machine before her. And stated she is not an animal and they used that term against her to be prejudice. Patient stated she is going to roberto this hospital and the \"people downstairs\" for being racist.     Patient stated she \"has to get up out of here before she does something crazy\". Patient told by this RN that I will get the [place] papers printed out so she can sign them and leave.     Electronically signed by Rock Oly RN on 12/2/2021 at 5:22 PM

## 2021-12-02 NOTE — PROGRESS NOTES
Patient appears more calm at this time, MRI checklist signed and placed in chart. Patient refusing POC glucose check. Bag provided for patients jewelery, will continue to monitor.

## 2021-12-02 NOTE — PROGRESS NOTES
This RN (charge) informed by [facility] ELIAZAR Brown and MD Alex Vu that patient has fired MD Alex Vu, hospitalist. Gio Fraga re-consulted a hospitalist to find patient a new physician. This RN received a call from [employer]/ the hospitalist group stating that patient is unable to fire her hospitalist. Update provided to patient's primary RN Gilberto Selby and MD Alex Vu. MD agreeable to continuing patient care. Update not provided to patient at this time as she has left the unit to obtain an MRI.

## 2021-12-02 NOTE — PROGRESS NOTES
Hospitalist Progress Note      PCP: No primary care provider on file. Date of Admission: 11/30/2021    Chief Complaint:   General Field pain    Hospital Course: The patient is a 76 y.o. female with past history of type 2 diabetes, hypertension, previous CAD who presents to [facility] with concern for at least the past few months she has had intermittent left upper quadrant abdominal pain of uncertain etiology, she has been treated for previous issues with back pain by her PCP but apparently this pain seems to be worsening in the last few days especially up until today where it seemed to become unbearable. She notes that she has not recognized any major weight loss for the last few months although she feels as though her weight seems to fluctuate very easily. She denies any recent blood in urine/stool/sputum or any recent vaginal bleeding of concern. She has no previous history of cancer or other diagnoses of cancer recently and she has not had any major abdominal surgeries. She notes she is menopausal and has had some intermittent hot flashes but has not had anything otherwise of note. She denies any recent symptoms of dysuria, fever, chills, dizziness, syncope, chest pain, shortness of breath, leg swelling, rashes, focal weakness, focal vision changes.     Subjective:   Left lateral quadrant abdo pain  No N/V/D  No F/C      Medications:  Reviewed    Infusion Medications    sodium chloride      dextrose       Scheduled Medications    lisinopril  40 mg Oral Daily    senna  2 tablet Oral Nightly    HYDROmorphone  1 mg IntraVENous Once    sodium chloride flush  5-40 mL IntraVENous 2 times per day    enoxaparin  40 mg SubCUTAneous Daily    insulin lispro  0-6 Units SubCUTAneous TID WC    insulin lispro  0-3 Units SubCUTAneous Nightly    aspirin  81 mg Oral Daily    amLODIPine  10 mg Oral Daily    metoprolol succinate  25 mg Oral Daily     PRN Meds: hydrALAZINE, magnesium hydroxide, HYDROmorphone, sodium chloride flush, sodium chloride, naloxone, ondansetron, glucose, dextrose, glucagon (rDNA), dextrose, oxyCODONE **OR** oxyCODONE    No intake or output data in the 24 hours ending 12/02/21 1016    Physical Exam Performed:    BP (!) 162/87   Pulse 71   Temp 98.3 °F (36.8 °C) (Oral)   Resp 16   Ht 5' 2.5\" (1.588 m)   Wt 141 lb 15.6 oz (64.4 kg)   SpO2 93%   BMI 25.55 kg/m²     General appearance: Anxious+, appears stated age and cooperative. HEENT: Pupils equal, round, and reactive to light. Conjunctivae/corneas clear. Neck: Supple, with full range of motion. No jugular venous distention. Trachea midline. Respiratory:  Normal respiratory effort. Clear to auscultation, bilaterally without Rales/Wheezes/Rhonchi. Cardiovascular: Regular rate and rhythm with normal S1/S2 without murmurs, rubs or gallops. Abdomen: Soft, non-tender, non-distended with normal bowel sounds. Musculoskeletal: No clubbing, cyanosis or edema bilaterally. Full range of motion without deformity. Skin: Skin color, texture, turgor normal.  No rashes or lesions. Neurologic:  Neurovascularly intact without any focal sensory/motor deficits. Cranial nerves: II-XII intact, grossly non-focal.  Psychiatric: Alert and oriented, thought content appropriate, normal insight  Capillary Refill: Brisk,3 seconds, normal   Peripheral Pulses: +2 palpable, equal bilaterally       Labs:   Recent Labs     11/30/21  1304 12/01/21  0817 12/02/21  0600   WBC 8.7 8.9 8.8   HGB 13.0 12.2 12.3   HCT 40.9 39.3 39.6    175 183     Recent Labs     11/30/21  1304 12/01/21  0817 12/02/21  0600    139 135*   K 4.3 4.0 4.2    105 103   CO2 20* 23 21   BUN 14 10 12   CREATININE 0.7 0.5* 0.6   CALCIUM 9.8 8.8 9.0     Recent Labs     11/30/21  1304 12/01/21  0817 12/02/21  0600   AST 14* 13* 12*   ALT 15 13 14   BILITOT 0.3 0.4 <0.2   ALKPHOS 64 57 62     No results for input(s): INR in the last 72 hours.   Recent Labs 11/30/21  1756   TROPONINI <0.01       Urinalysis:      Lab Results   Component Value Date    NITRU Negative 11/30/2021    WBCUA 3 11/30/2021    BACTERIA 1+ 11/30/2021    RBCUA 2 11/30/2021    BLOODU Negative 11/30/2021    SPECGRAV 1.023 11/30/2021    GLUCOSEU 100 11/30/2021       Radiology:  US PELVIS COMPLETE   Final Result   Examination is extremely limited due to overlying bowel gas and inability to   perform transvaginal images. Repeat ultrasound is recommended when patient   is able to consent. The uterus and ovaries were unable to be visualized. CT ABDOMEN PELVIS W IV CONTRAST Additional Contrast? None   Final Result   1. No evident acute findings in the abdomen or pelvis. Specifically, no   findings suggestive of acute pancreatitis despite elevated serum lipase. 2. Mild dilation of the main pancreatic duct is likely related to mild to   moderate parenchymal atrophy. There is no concomitant biliary dilation to   suggest an obstructing pancreatic or ampullary lesion. 3. An indeterminate 0.9 cm lesion in the upper pole of the left kidney may be   a solid neoplasm or complicated cyst.  Recommend further evaluation with   renal protocol abdomen MRI (preferred) or CT on a nonemergent basis. 4. A 1.3 cm lesion in the right adrenal gland is most likely an adenoma. Recommend follow-up with adrenal protocol abdomen CT in 1 year as below. Of   note, this could also be evaluated on the above recommended MRI. 5. A 4.1 cm left adnexal cystic lesion may have wall thickening and arises   from the left ovary, suggesting the possibility of a left ovarian neoplasm. Recommend further evaluation with sonography on a nonemergent basis as below. RECOMMENDATIONS:   Probable benign adenoma. Recommend 1 year follow up adrenal washout CT. If   stable ? 1 year, no further follow-up imaging.  Note: This recommendation does   not apply to patients younger than 18 years, patients with cancer, and patients with any clinical suspicion of a functioning adrenal lesion. References: JACR 2017 Aug; 14(8):1038-44, JCAT 2016 Mar-Apr; 40(2):194-200. Prompt US recommended. Prompt US recommended. Note: This recommendation   excludes cysts stable ? 2 years, cysts previously characterized by US or MR,   corpus luteum cysts and adnexal calcifications without associated soft tissue   mass. This recommendation does not apply to premenarchal patients and to   those with increased risk (genetic, family history, elevated tumor markers or   other high-risk factors) of ovarian cancer. Reference: JACR 2020 Feb;   17(2):248-254         MRI ABDOMEN W WO CONTRAST    (Results Pending)           ASSESSMENT  1. Adrenal mass-right;  2. Left adnexial cyst lesion- to r/o malignancy  3. Left kidney upper pole lesion- r/o malignancy    PLAN  -Had CT abdo/pelvis- results noted. -US abdo/pelvis  -Transvaginal US ordered by gyn  -await MRI abdo/pelvis  -Urology, oncology and gyne consulted  -CBC/CMP    NOTE:  Seen in the presence of a chaperone. Pt unhappy about the care. She claims she came to the ED for left sided pain- pointing in the area of the left upper quadrant. She says we are doing CTs/MRIs in the wrong place and cant find the cause of the pain. She refuses that the LUQ is part of the abdomen. She says you all talking about my belly which I dont have,. I have a stomach. I told her her CT abdomen was abnormal that's why we are doing the tests and she still refutes anything I say. I actually spoke to her yesterday in the presence of the ED nurse Sindhu Anna and explained the CT/US results. She apparently heard me say her problem was in her lungs which I never said. She was expressing a lot of anger and shouting at which point I thought the communication was not going well.   She did express that she wanted a different physician to which I agreed as we were not on the same page and the patient was not willing or ready to listen or discuss what I was saying.   As a result I will not see this patient on daily rounds and she will have to be assigned to a different physician.           DVT Prophylaxis: Lovenox  Diet: Diet NPO Exceptions are: Ice Chips, Sips of Water with Meds  Code Status: Full Code  PT/OT Eval Status: Ambulatory     Dispo -pending clinical course    Huber Ayala MD

## 2021-12-02 NOTE — PROGRESS NOTES
Patient made aware of risks of leaving the hospital AMA and decided to still leave. Paperwork signed and in patient chart.   Electronically signed by Justin Gonzalez RN on 12/2/2021 at 5:35 PM

## 2021-12-03 LAB — CA 19-9: <1 U/ML (ref 0–35)

## 2021-12-03 NOTE — DISCHARGE SUMMARY
Hospital Medicine Discharge Summary    Patient: Jo Ann Neal     Gender: female  : 1955   Age: 78 y.o. MRN: [id number]    Admitting Physician: Lina Nascimento DO  Discharge Physician: Hunter Todd MD     Code Status: Prior     Admit Date: 2021   Discharge Date: 2021      Disposition:  AMA    Discharge Diagnoses: Active Hospital Problems    Diagnosis Date Noted    Abdominal pain [R10.9] 2021       Follow-up appointments:  None made    Outpatient to do list: none    Condition at Discharge:  [facility] CARE OF [place] Course: The patient is L 15 y.o. female with past history of type 2 diabetes, hypertension, previous CAD who presents to [facility] with concern for at least the past few months she has had intermittent left upper quadrant abdominal pain of uncertain etiology, she has been treated for previous issues with back pain by her PCP but apparently this pain seems to be worsening in the last few days especially up until today where it seemed to become unbearable.  She notes that she has not recognized any major weight loss for the last few months although she feels as though her weight seems to fluctuate very easily.  She denies any recent blood in urine/stool/sputum or any recent vaginal bleeding of concern.  She has no previous history of cancer or other diagnoses of cancer recently and she has not had any major abdominal surgeries.  She notes she is menopausal and has had some intermittent hot flashes but has not had anything otherwise of note.  She denies any recent symptoms of dysuria, fever, chills, dizziness, syncope, chest pain, shortness of breath, leg swelling, rashes, focal weakness, focal vision changes.       Discharge Medications:   Discharge Medication List as of 2021  6:12 PM        Discharge Medication List as of 2021  6:12 PM        Discharge Medication List as of 2021  6:12 PM      CONTINUE these medications which have NOT CHANGED Details   metFORMIN (GLUCOPHAGE) 1000 MG tablet Take 1,000 mg by mouth 2 times daily (with meals)Historical Med      albuterol sulfate HFA (VENTOLIN HFA) 108 (90 Base) MCG/ACT inhaler Inhale 2 puffs into the lungs every 6 hours as needed for WheezingHistorical Med      aspirin 81 MG EC tablet Take 81 mg by mouth dailyHistorical Med      atorvastatin (LIPITOR) 40 MG tablet Take 40 mg by mouth dailyHistorical Med      Dulaglutide (TRULICITY) 1.5 GK/6.0OM SOPN Inject 1.5 mg into the skin once a weekHistorical Med      insulin glargine (LANTUS) 100 UNIT/ML injection vial Inject 20 Units into the skin nightly as needed Takes if BG >150Historical Med      lisinopril (PRINIVIL;ZESTRIL) 40 MG tablet Take 40 mg by mouth dailyHistorical Med      metoprolol succinate (TOPROL XL) 25 MG extended release tablet Take 25 mg by mouth dailyHistorical Med      Multiple Vitamins-Minerals (THERAPEUTIC MULTIVITAMIN-MINERALS) tablet Take 1 tablet by mouth dailyHistorical Med      polyethylene glycol (GLYCOLAX) 17 g packet Take 17 g by mouth daily as needed for ConstipationHistorical Med      amLODIPine (NORVASC) 10 MG tablet Take 10 mg by mouth dailyHistorical Med           Discharge Medication List as of 12/2/2021  6:12 PM          Discharge ROS:  Not done    Discharge Exam: Not done. Signed:    Sedrick Rollins MD   12/2/2021      Thank you No primary care provider on file. for the opportunity to be involved in this patient's care.  If you have any questions or concerns please feel free to contact me at [facility]

## 2023-09-18 ENCOUNTER — HOSPITAL ENCOUNTER (EMERGENCY)
Age: 68
Discharge: HOME | End: 2023-09-18
Attending: EMERGENCY MEDICINE
Payer: MEDICAID

## 2023-09-18 ENCOUNTER — APPOINTMENT (OUTPATIENT)
Dept: CT IMAGING | Age: 68
End: 2023-09-18
Payer: MEDICAID

## 2023-09-18 VITALS
WEIGHT: 138.89 LBS | DIASTOLIC BLOOD PRESSURE: 88 MMHG | OXYGEN SATURATION: 95 % | HEART RATE: 76 BPM | RESPIRATION RATE: 18 BRPM | BODY MASS INDEX: 25 KG/M2 | TEMPERATURE: 98.3 F | SYSTOLIC BLOOD PRESSURE: 184 MMHG

## 2023-09-18 DIAGNOSIS — R10.9 ACUTE ABDOMINAL PAIN: Primary | ICD-10-CM

## 2023-09-18 DIAGNOSIS — K59.00 CONSTIPATION, UNSPECIFIED CONSTIPATION TYPE: ICD-10-CM

## 2023-09-18 LAB
ALBUMIN SERPL-MCNC: 3.8 G/DL (ref 3.4–5)
ALBUMIN/GLOB SERPL: 1 {RATIO} (ref 1.1–2.2)
ALP SERPL-CCNC: 77 U/L (ref 40–129)
ALT SERPL-CCNC: <5 U/L (ref 10–40)
ANION GAP SERPL CALCULATED.3IONS-SCNC: 9 MMOL/L (ref 3–16)
AST SERPL-CCNC: 13 U/L (ref 15–37)
BACTERIA URNS QL MICRO: ABNORMAL /HPF
BASOPHILS # BLD: 0 K/UL (ref 0–0.2)
BASOPHILS NFR BLD: 0.6 %
BILIRUB SERPL-MCNC: 0.4 MG/DL (ref 0–1)
BILIRUB UR QL STRIP.AUTO: NEGATIVE
BUN SERPL-MCNC: 15 MG/DL (ref 7–20)
CALCIUM SERPL-MCNC: 9.7 MG/DL (ref 8.3–10.6)
CHLORIDE SERPL-SCNC: 106 MMOL/L (ref 99–110)
CLARITY UR: CLEAR
CO2 SERPL-SCNC: 23 MMOL/L (ref 21–32)
COLOR UR: YELLOW
CREAT SERPL-MCNC: 0.7 MG/DL (ref 0.6–1.2)
DEPRECATED RDW RBC AUTO: 15 % (ref 12.4–15.4)
EOSINOPHIL # BLD: 0.2 K/UL (ref 0–0.6)
EOSINOPHIL NFR BLD: 2.1 %
EPI CELLS #/AREA URNS HPF: ABNORMAL /HPF (ref 0–5)
GFR SERPLBLD CREATININE-BSD FMLA CKD-EPI: >60 ML/MIN/{1.73_M2}
GLUCOSE SERPL-MCNC: 184 MG/DL (ref 70–99)
GLUCOSE UR STRIP.AUTO-MCNC: NEGATIVE MG/DL
HCT VFR BLD AUTO: 42.6 % (ref 36–48)
HGB BLD-MCNC: 13.8 G/DL (ref 12–16)
HGB UR QL STRIP.AUTO: ABNORMAL
KETONES UR STRIP.AUTO-MCNC: NEGATIVE MG/DL
LEUKOCYTE ESTERASE UR QL STRIP.AUTO: NEGATIVE
LIPASE SERPL-CCNC: 35 U/L (ref 13–60)
LYMPHOCYTES # BLD: 2.5 K/UL (ref 1–5.1)
LYMPHOCYTES NFR BLD: 30.9 %
MCH RBC QN AUTO: 23.4 PG (ref 26–34)
MCHC RBC AUTO-ENTMCNC: 32.3 G/DL (ref 31–36)
MCV RBC AUTO: 72.5 FL (ref 80–100)
MONOCYTES # BLD: 0.7 K/UL (ref 0–1.3)
MONOCYTES NFR BLD: 8.4 %
NEUTROPHILS # BLD: 4.8 K/UL (ref 1.7–7.7)
NEUTROPHILS NFR BLD: 58 %
NITRITE UR QL STRIP.AUTO: NEGATIVE
PH UR STRIP.AUTO: 6 [PH] (ref 5–8)
PLATELET # BLD AUTO: 193 K/UL (ref 135–450)
PMV BLD AUTO: 10 FL (ref 5–10.5)
POTASSIUM SERPL-SCNC: 4.3 MMOL/L (ref 3.5–5.1)
PROT SERPL-MCNC: 7.6 G/DL (ref 6.4–8.2)
PROT UR STRIP.AUTO-MCNC: >=300 MG/DL
RBC # BLD AUTO: 5.88 M/UL (ref 4–5.2)
RBC #/AREA URNS HPF: ABNORMAL /HPF (ref 0–4)
SODIUM SERPL-SCNC: 138 MMOL/L (ref 136–145)
SP GR UR STRIP.AUTO: 1.02 (ref 1–1.03)
TRICHOMONAS #/AREA URNS HPF: ABNORMAL /HPF
UA COMPLETE W REFLEX CULTURE PNL UR: ABNORMAL
UA DIPSTICK W REFLEX MICRO PNL UR: YES
URN SPEC COLLECT METH UR: ABNORMAL
UROBILINOGEN UR STRIP-ACNC: 1 E.U./DL
WBC # BLD AUTO: 8.2 K/UL (ref 4–11)
WBC #/AREA URNS HPF: ABNORMAL /HPF (ref 0–5)

## 2023-09-18 PROCEDURE — 83690 ASSAY OF LIPASE: CPT

## 2023-09-18 PROCEDURE — 81001 URINALYSIS AUTO W/SCOPE: CPT

## 2023-09-18 PROCEDURE — 36415 COLL VENOUS BLD VENIPUNCTURE: CPT

## 2023-09-18 PROCEDURE — 6360000004 HC RX CONTRAST MEDICATION: Performed by: EMERGENCY MEDICINE

## 2023-09-18 PROCEDURE — 80053 COMPREHEN METABOLIC PANEL: CPT

## 2023-09-18 PROCEDURE — 85025 COMPLETE CBC W/AUTO DIFF WBC: CPT

## 2023-09-18 PROCEDURE — 74177 CT ABD & PELVIS W/CONTRAST: CPT

## 2023-09-18 PROCEDURE — 99285 EMERGENCY DEPT VISIT HI MDM: CPT

## 2023-09-18 RX ORDER — CEPHALEXIN 500 MG/1
500 CAPSULE ORAL 3 TIMES DAILY
Qty: 21 CAPSULE | Refills: 0 | Status: SHIPPED | OUTPATIENT
Start: 2023-09-18 | End: 2023-09-25

## 2023-09-18 RX ADMIN — IOMEPROL INJECTION 100 ML: 714 INJECTION, SOLUTION INTRAVASCULAR at 13:22

## 2023-09-18 ASSESSMENT — PAIN DESCRIPTION - PAIN TYPE: TYPE: ACUTE PAIN

## 2023-09-18 ASSESSMENT — PAIN SCALES - GENERAL: PAINLEVEL_OUTOF10: 6

## 2023-09-18 ASSESSMENT — PAIN DESCRIPTION - DESCRIPTORS: DESCRIPTORS: CRAMPING

## 2023-09-18 ASSESSMENT — PAIN - FUNCTIONAL ASSESSMENT: PAIN_FUNCTIONAL_ASSESSMENT: 0-10

## 2023-09-18 ASSESSMENT — PAIN DESCRIPTION - FREQUENCY: FREQUENCY: INTERMITTENT

## 2023-09-18 ASSESSMENT — PAIN DESCRIPTION - ORIENTATION: ORIENTATION: RIGHT

## 2023-09-18 ASSESSMENT — PAIN DESCRIPTION - ONSET: ONSET: PROGRESSIVE

## 2023-09-18 ASSESSMENT — PAIN DESCRIPTION - LOCATION: LOCATION: ABDOMEN

## 2023-09-18 NOTE — ED NOTES
Patient requesting something to eat and drink. Patient given something to drink, and reinforced that as long as it sits ok on her stomach I can get her something to eat. She is agreeable.       Dipak Chaidez RN  09/18/23 2999

## 2023-09-18 NOTE — ED NOTES
Patient DCed from ED at this time. Discussed AVS, follow up, and scripts. They verbalized understanding. Reinforced that should symptoms persist or worsen to return to the ED. They verbalized understanding. Patient declines to have her vitals rechecked stating that she really needs to leave. States she will take her PO BP medication when she returns home. Patient ambulated out of ED. RN thanked patient for choosing [facility] ([facility]).         Eulalia Duke RN  09/18/23 [id number]

## 2023-09-18 NOTE — ED NOTES
Patient continuously calling out asking when she can be Dced and wanting her IV pulled. States to this RN \"He said I was leaving. \" PIV pulled, reinforced that once her paperwork is ready this RN will bring it back.       Tauqeria Anthony RN  09/18/23 5557

## 2023-09-18 NOTE — ED PROVIDER NOTES
[address] [facility],[address] ENCOUNTER      Pt Name: Cathie Lambert  MRN: [id number]  [address] [facility] 1955  Date of evaluation: 9/18/2023  Provider: Oneil Mccrary       Chief Complaint   Patient presents with    Abdominal Pain     C/o abd pain past 7 days starts on rt side of abd radiating to epigastric area feeling constipated          HISTORY OF PRESENT ILLNESS   (Location/Symptom, Timing/Onset, Context/Setting, Quality, Duration, Modifying Factors, Severity)  Note limiting factors. Cathie Lambert is a 75 y.o. female who presents to the emergency department with a complaint of right-sided abdominal pain that began last Sunday, 8 days ago. She states the pain was mild at first and is gradually worsened. She describes it as sharp and stabbing. The pain originates in the right posterior flank area and wraps around to the right middle quadrant and epigastric area. She denies any trauma or injury. Appetite has been decreased but she denies any nausea or vomiting. No diarrhea. She denies any melena or hematochezia. She denies any rash or any skin lesions. She does report some discomfort when she urinates as well as some urinary frequency and urgency. She denies any associated chest pain heaviness pressure tightness shortness of breath diaphoresis dyspnea on exertion. She denies any syncope or palpitations. She denies any recent cough or cold symptoms. No fever or chills. Medical history is significant for hypertension, hypothyroidism, hyponatremia, type 2 diabetes. She also reports a history of asthma. She smokes tobacco.    She denies any actual back pain or radicular pain down the legs. No weakness or numbness. No incontinence. No bowel or bladder difficulties. No saddle anesthesia. She reports similar pain in the past when she had diverticulitis.     She reports that she was constipated last week and her primary care physician prescribed

## 2023-09-18 NOTE — ED NOTES
RN enters room to place PIV. Patient states \"you're going to have to use a butterfly needle, lets just get that out of the way right now. My veins are small and I'm not getting stuck a million times. \" RN states that she could use the smallest needle available but, there were no butterfly needles for PIVs. She verbalizes understanding. Blood obtained, PIV placed without issue, and walked to lab. BP is elevated, patient states it's always high and she has not taken her BP medications today.       Teri Orellana, RN  09/18/23 Olivia Ulrich RN  09/18/23 [id number]

## 2024-02-13 ENCOUNTER — HOSPITAL ENCOUNTER (EMERGENCY)
Age: 69
Discharge: HOME | End: 2024-02-13
Payer: MEDICAID

## 2024-02-13 VITALS
TEMPERATURE: 98.6 F | HEART RATE: 80 BPM | OXYGEN SATURATION: 99 % | SYSTOLIC BLOOD PRESSURE: 158 MMHG | DIASTOLIC BLOOD PRESSURE: 88 MMHG | WEIGHT: 125.44 LBS | RESPIRATION RATE: 17 BRPM | BODY MASS INDEX: 22.58 KG/M2

## 2024-02-13 DIAGNOSIS — M62.830 LUMBAR PARASPINAL MUSCLE SPASM: Primary | ICD-10-CM

## 2024-02-13 DIAGNOSIS — R14.0 GASSINESS: ICD-10-CM

## 2024-02-13 LAB
BACTERIA URNS QL MICRO: ABNORMAL /HPF
BILIRUB UR QL STRIP.AUTO: NEGATIVE
CLARITY UR: CLEAR
COLOR UR: YELLOW
EPI CELLS #/AREA URNS AUTO: 7 /HPF (ref 0–5)
GLUCOSE UR STRIP.AUTO-MCNC: NEGATIVE MG/DL
HGB UR QL STRIP.AUTO: NEGATIVE
HYALINE CASTS #/AREA URNS AUTO: 2 /LPF (ref 0–8)
KETONES UR STRIP.AUTO-MCNC: NEGATIVE MG/DL
LEUKOCYTE ESTERASE UR QL STRIP.AUTO: ABNORMAL
NITRITE UR QL STRIP.AUTO: NEGATIVE
PH UR STRIP.AUTO: 6 [PH] (ref 5–8)
PROT UR STRIP.AUTO-MCNC: 300 MG/DL
RBC CLUMPS #/AREA URNS AUTO: 1 /HPF (ref 0–4)
SP GR UR STRIP.AUTO: 1.01 (ref 1–1.03)
UA COMPLETE W REFLEX CULTURE PNL UR: ABNORMAL
UA DIPSTICK W REFLEX MICRO PNL UR: YES
URN SPEC COLLECT METH UR: ABNORMAL
UROBILINOGEN UR STRIP-ACNC: 1 E.U./DL
WBC #/AREA URNS AUTO: 6 /HPF (ref 0–5)

## 2024-02-13 PROCEDURE — 6370000000 HC RX 637 (ALT 250 FOR IP): Performed by: PHYSICIAN ASSISTANT

## 2024-02-13 PROCEDURE — 99283 EMERGENCY DEPT VISIT LOW MDM: CPT

## 2024-02-13 PROCEDURE — 81001 URINALYSIS AUTO W/SCOPE: CPT

## 2024-02-13 RX ORDER — MAGNESIUM HYDROXIDE/ALUMINUM HYDROXICE/SIMETHICONE 120; 1200; 1200 MG/30ML; MG/30ML; MG/30ML
30 SUSPENSION ORAL
Status: COMPLETED | OUTPATIENT
Start: 2024-02-13 | End: 2024-02-13

## 2024-02-13 RX ORDER — LIDOCAINE 4 G/G
1 PATCH TOPICAL
Status: DISCONTINUED | OUTPATIENT
Start: 2024-02-13 | End: 2024-02-14 | Stop reason: HOSPADM

## 2024-02-13 RX ORDER — SIMETHICONE 125 MG
125 TABLET,CHEWABLE ORAL DAILY PRN
Qty: 60 TABLET | Refills: 0 | Status: SHIPPED | OUTPATIENT
Start: 2024-02-13

## 2024-02-13 RX ORDER — LIDOCAINE 50 MG/G
1 PATCH TOPICAL DAILY
Qty: 10 PATCH | Refills: 0 | Status: SHIPPED | OUTPATIENT
Start: 2024-02-13 | End: 2024-02-23

## 2024-02-13 RX ORDER — CYCLOBENZAPRINE HCL 10 MG
10 TABLET ORAL ONCE
Status: COMPLETED | OUTPATIENT
Start: 2024-02-13 | End: 2024-02-13

## 2024-02-13 RX ORDER — CYCLOBENZAPRINE HCL 10 MG
10 TABLET ORAL
Qty: 10 TABLET | Refills: 0 | Status: SHIPPED | OUTPATIENT
Start: 2024-02-13

## 2024-02-13 RX ADMIN — CYCLOBENZAPRINE 10 MG: 10 TABLET, FILM COATED ORAL at 23:17

## 2024-02-13 RX ADMIN — ALUMINUM HYDROXIDE, MAGNESIUM HYDROXIDE, AND SIMETHICONE 30 ML: 1200; 120; 1200 SUSPENSION ORAL at 23:17

## 2024-02-14 NOTE — DISCHARGE INSTRUCTIONS
Home in stable condition to use medications as written, consider using Beano or equivalent with gaseous causing foods and potentially avoiding or minimizing dairy use later in the day.  Follow-up outpatient with orthospine for further care and treatment concerning ongoing lumbar back spasms and tenderness and your family doctor for further care.  Return to the ER for any emergency worsening or concern.

## 2024-02-14 NOTE — ED PROVIDER NOTES
**ADVANCED PRACTICE PROVIDER, I HAVE EVALUATED THIS PATIENT**        [facility] EMERGENCY DEPARTMENT  EMERGENCY DEPARTMENT ENCOUNTER      Pt Name: Meagan Andino  MRN:[id number]  Birthdate 1955  Date of evaluation: 2/13/2024  Provider: Cesar Ghotra PA-C  Note Started: 11:39 PM EST 2/13/24        Chief Complaint:    Chief Complaint   Patient presents with    Back Pain     Pt reports right sided back pain \"since September\" that hasn't improved. Pt alert and oriented at this time with no signs of distress noted. Pt rates pain as a 9/10.         Nursing Notes, Past Medical Hx, Past Surgical Hx, Social Hx, Allergies, and Family Hx were all reviewed and agreed with or any disagreements were addressed in the HPI.    HPI: (Location, Duration, Timing, Severity, Quality, Assoc Sx, Context, Modifying factors)    History From: Patient          Chief Complaint of ongoing low back pain and tightness especially at night, ongoing abdominal cramping and bloating especially at night    This is a  68 y.o. female who presents stating the back issues have been going on months.  No acute worsening tonight but just tired of struggling to sleep.  Also a lot of cramping in the abdomen.  She has done different things with her diet because of diabetes and trying to eat more vegetables and fruits and does not know if that could be related. Because of these things she came in to be seen.     She denies fevers or chills, bowel or bladder incontinence, inability to urinate, numbness or tingling to the perineal area, nausea or vomiting. Her pain is worse with range of motion and movement better with rest.    PastMedical/Surgical History:      Diagnosis Date    Hypertension     Type 2 diabetes mellitus (HCC)      History reviewed. No pertinent surgical history.    Medications:  Previous Medications    ALBUTEROL SULFATE HFA (VENTOLIN HFA) 108 (90 BASE) MCG/ACT INHALER    Inhale 2 puffs into the lungs every 6 hours as needed

## 2024-02-29 ENCOUNTER — HOSPITAL ENCOUNTER (EMERGENCY)
Age: 69
Discharge: HOME | End: 2024-02-29
Payer: MEDICAID

## 2024-02-29 ENCOUNTER — APPOINTMENT (OUTPATIENT)
Dept: GENERAL RADIOLOGY | Age: 69
End: 2024-02-29
Payer: MEDICAID

## 2024-02-29 VITALS
OXYGEN SATURATION: 96 % | DIASTOLIC BLOOD PRESSURE: 98 MMHG | SYSTOLIC BLOOD PRESSURE: 185 MMHG | WEIGHT: 121.25 LBS | BODY MASS INDEX: 21.82 KG/M2 | HEART RATE: 80 BPM | TEMPERATURE: 98.3 F | RESPIRATION RATE: 11 BRPM

## 2024-02-29 DIAGNOSIS — R07.89 ATYPICAL CHEST PAIN: ICD-10-CM

## 2024-02-29 DIAGNOSIS — M54.6 RIGHT-SIDED THORACIC BACK PAIN, UNSPECIFIED CHRONICITY: Primary | ICD-10-CM

## 2024-02-29 LAB
ANION GAP SERPL CALCULATED.3IONS-SCNC: 9 MMOL/L (ref 3–16)
BASOPHILS # BLD: 0.1 K/UL (ref 0–0.2)
BASOPHILS NFR BLD: 1.1 %
BUN SERPL-MCNC: 15 MG/DL (ref 7–20)
CALCIUM SERPL-MCNC: 9.9 MG/DL (ref 8.3–10.6)
CHLORIDE SERPL-SCNC: 103 MMOL/L (ref 99–110)
CO2 SERPL-SCNC: 26 MMOL/L (ref 21–32)
CREAT SERPL-MCNC: 0.7 MG/DL (ref 0.6–1.2)
D DIMER: 0.48 UG/ML FEU (ref 0–0.6)
DEPRECATED RDW RBC AUTO: 14.7 % (ref 12.4–15.4)
EKG ATRIAL RATE: 78 BPM
EKG DIAGNOSIS: NORMAL
EKG P AXIS: 41 DEGREES
EKG P-R INTERVAL: 154 MS
EKG Q-T INTERVAL: 374 MS
EKG QRS DURATION: 90 MS
EKG QTC CALCULATION (BAZETT): 426 MS
EKG R AXIS: -10 DEGREES
EKG T AXIS: 87 DEGREES
EKG VENTRICULAR RATE: 78 BPM
EOSINOPHIL # BLD: 0.2 K/UL (ref 0–0.6)
EOSINOPHIL NFR BLD: 2.1 %
GFR SERPLBLD CREATININE-BSD FMLA CKD-EPI: >60 ML/MIN/{1.73_M2}
GLUCOSE SERPL-MCNC: 155 MG/DL (ref 70–99)
HCT VFR BLD AUTO: 44.6 % (ref 36–48)
HGB BLD-MCNC: 14.2 G/DL (ref 12–16)
LYMPHOCYTES # BLD: 3.2 K/UL (ref 1–5.1)
LYMPHOCYTES NFR BLD: 40 %
MCH RBC QN AUTO: 23.3 PG (ref 26–34)
MCHC RBC AUTO-ENTMCNC: 31.9 G/DL (ref 31–36)
MCV RBC AUTO: 73.1 FL (ref 80–100)
MONOCYTES # BLD: 0.6 K/UL (ref 0–1.3)
MONOCYTES NFR BLD: 7.9 %
NEUTROPHILS # BLD: 3.9 K/UL (ref 1.7–7.7)
NEUTROPHILS NFR BLD: 48.9 %
NT-PROBNP SERPL-MCNC: 44 PG/ML (ref 0–124)
PLATELET # BLD AUTO: 214 K/UL (ref 135–450)
PLATELET BLD QL SMEAR: ADEQUATE
PMV BLD AUTO: 10.2 FL (ref 5–10.5)
POTASSIUM SERPL-SCNC: 4 MMOL/L (ref 3.5–5.1)
RBC # BLD AUTO: 6.1 M/UL (ref 4–5.2)
SODIUM SERPL-SCNC: 138 MMOL/L (ref 136–145)
TROPONIN, HIGH SENSITIVITY: 16 NG/L (ref 0–14)
TROPONIN, HIGH SENSITIVITY: 17 NG/L (ref 0–14)
WBC # BLD AUTO: 8 K/UL (ref 4–11)

## 2024-02-29 PROCEDURE — 85025 COMPLETE CBC W/AUTO DIFF WBC: CPT

## 2024-02-29 PROCEDURE — 6360000002 HC RX W HCPCS: Performed by: PHYSICIAN ASSISTANT

## 2024-02-29 PROCEDURE — 84484 ASSAY OF TROPONIN QUANT: CPT

## 2024-02-29 PROCEDURE — 96374 THER/PROPH/DIAG INJ IV PUSH: CPT

## 2024-02-29 PROCEDURE — 71045 X-RAY EXAM CHEST 1 VIEW: CPT

## 2024-02-29 PROCEDURE — 85379 FIBRIN DEGRADATION QUANT: CPT

## 2024-02-29 PROCEDURE — 96375 TX/PRO/DX INJ NEW DRUG ADDON: CPT

## 2024-02-29 PROCEDURE — 93005 ELECTROCARDIOGRAM TRACING: CPT | Performed by: PHYSICIAN ASSISTANT

## 2024-02-29 PROCEDURE — 99285 EMERGENCY DEPT VISIT HI MDM: CPT

## 2024-02-29 PROCEDURE — 6370000000 HC RX 637 (ALT 250 FOR IP): Performed by: PHYSICIAN ASSISTANT

## 2024-02-29 PROCEDURE — 83880 ASSAY OF NATRIURETIC PEPTIDE: CPT

## 2024-02-29 PROCEDURE — 93010 ELECTROCARDIOGRAM REPORT: CPT | Performed by: INTERNAL MEDICINE

## 2024-02-29 PROCEDURE — 80048 BASIC METABOLIC PNL TOTAL CA: CPT

## 2024-02-29 PROCEDURE — 96376 TX/PRO/DX INJ SAME DRUG ADON: CPT

## 2024-02-29 RX ORDER — ONDANSETRON 2 MG/ML
4 INJECTION INTRAMUSCULAR; INTRAVENOUS ONCE
Status: COMPLETED | OUTPATIENT
Start: 2024-02-29 | End: 2024-02-29

## 2024-02-29 RX ORDER — METHOCARBAMOL 500 MG/1
500 TABLET, FILM COATED ORAL EVERY 8 HOURS PRN
Qty: 12 TABLET | Refills: 0 | Status: SHIPPED | OUTPATIENT
Start: 2024-02-29

## 2024-02-29 RX ORDER — DIAZEPAM 5 MG/1
5 TABLET ORAL ONCE
Status: COMPLETED | OUTPATIENT
Start: 2024-02-29 | End: 2024-02-29

## 2024-02-29 RX ORDER — MORPHINE SULFATE 10 MG/ML
6 INJECTION, SOLUTION INTRAMUSCULAR; INTRAVENOUS ONCE
Status: COMPLETED | OUTPATIENT
Start: 2024-02-29 | End: 2024-02-29

## 2024-02-29 RX ORDER — ACETAMINOPHEN 500 MG
1000 TABLET ORAL ONCE
Status: DISCONTINUED | OUTPATIENT
Start: 2024-02-29 | End: 2024-02-29

## 2024-02-29 RX ORDER — MELOXICAM 7.5 MG/1
7.5 TABLET ORAL EVERY 12 HOURS PRN
Qty: 14 TABLET | Refills: 0 | Status: SHIPPED | OUTPATIENT
Start: 2024-02-29

## 2024-02-29 RX ORDER — KETOROLAC TROMETHAMINE 15 MG/ML
15 INJECTION, SOLUTION INTRAMUSCULAR; INTRAVENOUS ONCE
Status: COMPLETED | OUTPATIENT
Start: 2024-02-29 | End: 2024-02-29

## 2024-02-29 RX ADMIN — DIAZEPAM 5 MG: 5 TABLET ORAL at 12:31

## 2024-02-29 RX ADMIN — MORPHINE SULFATE 6 MG: 10 INJECTION, SOLUTION INTRAMUSCULAR; INTRAVENOUS at 12:32

## 2024-02-29 RX ADMIN — ONDANSETRON 4 MG: 2 INJECTION INTRAMUSCULAR; INTRAVENOUS at 15:31

## 2024-02-29 RX ADMIN — ONDANSETRON 4 MG: 2 INJECTION INTRAMUSCULAR; INTRAVENOUS at 12:34

## 2024-02-29 RX ADMIN — KETOROLAC TROMETHAMINE 15 MG: 15 INJECTION, SOLUTION INTRAMUSCULAR; INTRAVENOUS at 15:30

## 2024-02-29 ASSESSMENT — PAIN DESCRIPTION - LOCATION: LOCATION: CHEST

## 2024-02-29 ASSESSMENT — PAIN SCALES - GENERAL: PAINLEVEL_OUTOF10: 6

## 2024-02-29 ASSESSMENT — PAIN - FUNCTIONAL ASSESSMENT: PAIN_FUNCTIONAL_ASSESSMENT: 0-10

## 2024-02-29 NOTE — ED NOTES
Pt arrived to dept via ems.  Pt c/o Chest pain onset last night 2000. Cardiac hx. Vss. From home. A/ox3. 324 asa given by ems .  Pt awake, alert and oriented x 3.  Skin warm and dry/normal color for ethnicity.  Resp easy and unlabored.  Pt placed in gown and on cardiac monitor.  Call light in reach.

## 2024-02-29 NOTE — ED NOTES
All follow up care discussed. Prescriptions discussed. Stable and ambulatory upon dismissal. PIV removed and bleeding controlled.

## 2024-02-29 NOTE — ED PROVIDER NOTES
[facility] EMERGENCY DEPARTMENT  EMERGENCY DEPARTMENT ENCOUNTER        Pt Name: Meagan Andino  MRN: [id number]  Birthdate 1955  Date of evaluation: 2/29/2024  Provider: Cesar Ghotra PA-C  PCP: No primary care provider on file.  Note Started: 1:30 PM EST 2/29/24      ANNA. I have evaluated this patient.        CHIEF COMPLAINT       Chief Complaint   Patient presents with    Chest Pain     Chest pain onset last night 2000. Cardiac hx. Vss. From home. A/ox3. 324 asa given by ems        HISTORY OF PRESENT ILLNESS: 1 or more Elements     History From: Patient            Chief Complaint: Pain in the chest    Meagan Andino is a 68 y.o. female who presents complaining of pain in the right posterior back and scapular area around 2 the right side of the chest last night and into today.  She states that she only tried 2 baby aspirin at home and it did not help.  Therefore she decided to come to the ER.  When asked very specifically if she has any history of any count of heart issues such as a heart attack or any kind of stent placed or any kind of heart specific history she indicates no she does not.  Rather she has a history of hypertension and some other ongoing things with no heart history in particular.  Patient indicates that the pains that she is feeling are worse when she leans forward and better at rest.  No particular cough or congestion at this time or recent fever.  Nursing Notes were all reviewed and agreed with or any disagreements were addressed in the HPI.    REVIEW OF SYSTEMS :      Review of Systems  Positive history as above, no fevers or chills cough or congestion, no acute fall contusion or twisting injury headache vision change neck pain or stiffness.  No acute change in breathing or coughing.  No nausea or vomiting or abdominal pain.  No urine or stool acute change or extremity acute loss of sensation or movement.  Positives and Pertinent negatives as per HPI.     SURGICAL

## 2024-02-29 NOTE — DISCHARGE INSTRUCTIONS
Home in stable condition to consider cold compresses to areas of tenderness 15 or 20 minutes a couple times daily, use medications as written, and monitor for gradual improvement.  Follow-up outpatient with your family doctor next week for recheck and further care as well as with back specialist as planned for further care and treatment.  Return to the ER for any emergency worsening or concern.

## 2024-03-19 ENCOUNTER — APPOINTMENT (OUTPATIENT)
Dept: CT IMAGING | Age: 69
End: 2024-03-19
Payer: MEDICAID

## 2024-03-19 ENCOUNTER — HOSPITAL ENCOUNTER (EMERGENCY)
Age: 69
Discharge: HOME | End: 2024-03-20
Payer: MEDICAID

## 2024-03-19 ENCOUNTER — APPOINTMENT (OUTPATIENT)
Dept: GENERAL RADIOLOGY | Age: 69
End: 2024-03-19
Payer: MEDICAID

## 2024-03-19 VITALS
WEIGHT: 125.5 LBS | HEART RATE: 71 BPM | OXYGEN SATURATION: 100 % | SYSTOLIC BLOOD PRESSURE: 174 MMHG | DIASTOLIC BLOOD PRESSURE: 149 MMHG | HEIGHT: 62 IN | RESPIRATION RATE: 23 BRPM | TEMPERATURE: 98.3 F | BODY MASS INDEX: 23.1 KG/M2

## 2024-03-19 DIAGNOSIS — R10.9 CHRONIC ABDOMINAL PAIN: Primary | ICD-10-CM

## 2024-03-19 DIAGNOSIS — R14.0 ABDOMINAL DISTENSION: ICD-10-CM

## 2024-03-19 DIAGNOSIS — M54.41 ACUTE RIGHT-SIDED LOW BACK PAIN WITH RIGHT-SIDED SCIATICA: ICD-10-CM

## 2024-03-19 DIAGNOSIS — G89.29 CHRONIC ABDOMINAL PAIN: Primary | ICD-10-CM

## 2024-03-19 LAB
ALBUMIN SERPL-MCNC: 4.2 G/DL (ref 3.4–5)
ALBUMIN/GLOB SERPL: 1.1 {RATIO} (ref 1.1–2.2)
ALP SERPL-CCNC: 77 U/L (ref 40–129)
ALT SERPL-CCNC: 14 U/L (ref 10–40)
ANION GAP SERPL CALCULATED.3IONS-SCNC: 10 MMOL/L (ref 3–16)
AST SERPL-CCNC: 28 U/L (ref 15–37)
BACTERIA URNS QL MICRO: ABNORMAL /HPF
BASOPHILS # BLD: 0.1 K/UL (ref 0–0.2)
BASOPHILS NFR BLD: 0.9 %
BILIRUB SERPL-MCNC: 0.4 MG/DL (ref 0–1)
BILIRUB UR QL STRIP.AUTO: NEGATIVE
BUN SERPL-MCNC: 13 MG/DL (ref 7–20)
CALCIUM SERPL-MCNC: 9.5 MG/DL (ref 8.3–10.6)
CHLORIDE SERPL-SCNC: 102 MMOL/L (ref 99–110)
CLARITY UR: CLEAR
CO2 SERPL-SCNC: 25 MMOL/L (ref 21–32)
COLOR UR: YELLOW
CREAT SERPL-MCNC: 0.6 MG/DL (ref 0.6–1.2)
DEPRECATED RDW RBC AUTO: 14.7 % (ref 12.4–15.4)
EOSINOPHIL # BLD: 0.2 K/UL (ref 0–0.6)
EOSINOPHIL NFR BLD: 1.8 %
EPI CELLS #/AREA URNS AUTO: 4 /HPF (ref 0–5)
GFR SERPLBLD CREATININE-BSD FMLA CKD-EPI: >60 ML/MIN/{1.73_M2}
GLUCOSE SERPL-MCNC: 154 MG/DL (ref 70–99)
GLUCOSE UR STRIP.AUTO-MCNC: NEGATIVE MG/DL
HCT VFR BLD AUTO: 43.9 % (ref 36–48)
HGB BLD-MCNC: 13.9 G/DL (ref 12–16)
HGB UR QL STRIP.AUTO: NEGATIVE
HYALINE CASTS #/AREA URNS AUTO: 0 /LPF (ref 0–8)
KETONES UR STRIP.AUTO-MCNC: NEGATIVE MG/DL
LEUKOCYTE ESTERASE UR QL STRIP.AUTO: ABNORMAL
LIPASE SERPL-CCNC: 36 U/L (ref 13–60)
LYMPHOCYTES # BLD: 4 K/UL (ref 1–5.1)
LYMPHOCYTES NFR BLD: 46.3 %
MCH RBC QN AUTO: 22.9 PG (ref 26–34)
MCHC RBC AUTO-ENTMCNC: 31.8 G/DL (ref 31–36)
MCV RBC AUTO: 72 FL (ref 80–100)
MONOCYTES # BLD: 0.5 K/UL (ref 0–1.3)
MONOCYTES NFR BLD: 6.3 %
NEUTROPHILS # BLD: 3.8 K/UL (ref 1.7–7.7)
NEUTROPHILS NFR BLD: 44.7 %
NITRITE UR QL STRIP.AUTO: NEGATIVE
PH UR STRIP.AUTO: 6.5 [PH] (ref 5–8)
PLATELET # BLD AUTO: 201 K/UL (ref 135–450)
PMV BLD AUTO: 10.5 FL (ref 5–10.5)
POTASSIUM SERPL-SCNC: 3.6 MMOL/L (ref 3.5–5.1)
POTASSIUM SERPL-SCNC: 5.7 MMOL/L (ref 3.5–5.1)
PROT SERPL-MCNC: 8 G/DL (ref 6.4–8.2)
PROT UR STRIP.AUTO-MCNC: 30 MG/DL
RBC # BLD AUTO: 6.1 M/UL (ref 4–5.2)
RBC CLUMPS #/AREA URNS AUTO: 1 /HPF (ref 0–4)
SODIUM SERPL-SCNC: 137 MMOL/L (ref 136–145)
SP GR UR STRIP.AUTO: 1.04 (ref 1–1.03)
UA COMPLETE W REFLEX CULTURE PNL UR: ABNORMAL
UA DIPSTICK W REFLEX MICRO PNL UR: YES
URN SPEC COLLECT METH UR: ABNORMAL
UROBILINOGEN UR STRIP-ACNC: 1 E.U./DL
WBC # BLD AUTO: 8.6 K/UL (ref 4–11)
WBC #/AREA URNS AUTO: 4 /HPF (ref 0–5)

## 2024-03-19 PROCEDURE — 2580000003 HC RX 258: Performed by: PHYSICIAN ASSISTANT

## 2024-03-19 PROCEDURE — 74177 CT ABD & PELVIS W/CONTRAST: CPT

## 2024-03-19 PROCEDURE — 80053 COMPREHEN METABOLIC PANEL: CPT

## 2024-03-19 PROCEDURE — 83690 ASSAY OF LIPASE: CPT

## 2024-03-19 PROCEDURE — 2500000003 HC RX 250 WO HCPCS: Performed by: PHYSICIAN ASSISTANT

## 2024-03-19 PROCEDURE — 99285 EMERGENCY DEPT VISIT HI MDM: CPT

## 2024-03-19 PROCEDURE — 81001 URINALYSIS AUTO W/SCOPE: CPT

## 2024-03-19 PROCEDURE — 96372 THER/PROPH/DIAG INJ SC/IM: CPT

## 2024-03-19 PROCEDURE — 6360000004 HC RX CONTRAST MEDICATION: Performed by: PHYSICIAN ASSISTANT

## 2024-03-19 PROCEDURE — 6360000002 HC RX W HCPCS: Performed by: PHYSICIAN ASSISTANT

## 2024-03-19 PROCEDURE — 84132 ASSAY OF SERUM POTASSIUM: CPT

## 2024-03-19 PROCEDURE — 73502 X-RAY EXAM HIP UNI 2-3 VIEWS: CPT

## 2024-03-19 PROCEDURE — 96374 THER/PROPH/DIAG INJ IV PUSH: CPT

## 2024-03-19 PROCEDURE — A4216 STERILE WATER/SALINE, 10 ML: HCPCS | Performed by: PHYSICIAN ASSISTANT

## 2024-03-19 PROCEDURE — 85025 COMPLETE CBC W/AUTO DIFF WBC: CPT

## 2024-03-19 RX ORDER — ORPHENADRINE CITRATE 30 MG/ML
60 INJECTION INTRAMUSCULAR; INTRAVENOUS ONCE
Status: COMPLETED | OUTPATIENT
Start: 2024-03-19 | End: 2024-03-19

## 2024-03-19 RX ORDER — KETOROLAC TROMETHAMINE 15 MG/ML
15 INJECTION, SOLUTION INTRAMUSCULAR; INTRAVENOUS ONCE
Status: COMPLETED | OUTPATIENT
Start: 2024-03-19 | End: 2024-03-20

## 2024-03-19 RX ADMIN — ORPHENADRINE CITRATE 60 MG: 60 INJECTION INTRAMUSCULAR; INTRAVENOUS at 20:38

## 2024-03-19 RX ADMIN — FAMOTIDINE 20 MG: 10 INJECTION, SOLUTION INTRAVENOUS at 20:42

## 2024-03-19 RX ADMIN — IOPAMIDOL 75 ML: 755 INJECTION, SOLUTION INTRAVENOUS at 21:13

## 2024-03-19 ASSESSMENT — PAIN SCALES - GENERAL
PAINLEVEL_OUTOF10: 10
PAINLEVEL_OUTOF10: 10

## 2024-03-19 ASSESSMENT — PAIN - FUNCTIONAL ASSESSMENT: PAIN_FUNCTIONAL_ASSESSMENT: 0-10

## 2024-03-19 ASSESSMENT — PAIN DESCRIPTION - LOCATION: LOCATION: ABDOMEN

## 2024-03-20 PROCEDURE — 96375 TX/PRO/DX INJ NEW DRUG ADDON: CPT

## 2024-03-20 PROCEDURE — 6360000002 HC RX W HCPCS: Performed by: PHYSICIAN ASSISTANT

## 2024-03-20 RX ORDER — METHOCARBAMOL 750 MG/1
750 TABLET, FILM COATED ORAL 3 TIMES DAILY
Qty: 15 TABLET | Refills: 0 | Status: SHIPPED | OUTPATIENT
Start: 2024-03-20 | End: 2024-03-25

## 2024-03-20 RX ADMIN — KETOROLAC TROMETHAMINE 15 MG: 15 INJECTION, SOLUTION INTRAMUSCULAR; INTRAVENOUS at 00:17

## 2024-03-20 ASSESSMENT — PAIN SCALES - GENERAL: PAINLEVEL_OUTOF10: 10

## 2024-03-20 NOTE — ED PROVIDER NOTES
[facility] EMERGENCY DEPARTMENT  EMERGENCY DEPARTMENT ENCOUNTER        Pt Name: Meagan Andino  MRN: [id number]  Birthdate 1955  Date of evaluation: 3/19/2024  Provider: Sylvia Jain PA-C  PCP: No primary care provider on file.  Note Started: 8:10 PM EDT 3/19/24      ANNA. I have evaluated this patient.        CHIEF COMPLAINT       Chief Complaint   Patient presents with    Abdominal Pain     Pt states that there seems to be something in her stomach, described as a mass or air bubble, that moves from side to side and causes her severe abdominal pain. Pain moves as high as under her breasts. Pt states she googled her symptoms and is afraid it could be cancer. Pt also with complaint of chronic back pain. Pt also states she has lost 20+ pounds since last November without known cause.        HISTORY OF PRESENT ILLNESS: 1 or more Elements     History From: patient  Limitations to history : None    Meagan Andino is a 68 y.o. female who presents to the emergency department by EMS.  First, patient planing of abdominal pain and distention.  Patient states she has chronic abdominal pain and distention of her belly.  Pain has been ongoing since July of last year.  She has colonoscopy scheduled for 4/15/2024.  Patient states pain described as fullness and occasional sharp pain.  She denies any associated nausea, vomiting, fever, chills, diarrhea or constipation.  She has trouble with constipation but has been on a regimen and having normal bowel movements.  She denies any blood in bowel movements.  She denies any urinary symptoms.  Patient concerned her \"diverticulitis is acting up\".  She denies any chest pain, shortness of breath.  Secondly, patient complaning of right-sided low back pain.  Pain radiates into upper leg.  She has any trauma or injury to back.  Denies any leg weakness/numbness/ting, urine/bowel incontinence, urinary tension, saddle anesthesia.  Has been taking naproxen and  ***    Disposition Considerations (tests considered but not done, Admit vs D/C, Shared Decision Making, Pt Expectation of Test or Tx.): ***       I am the Primary Clinician of Record.  FINAL IMPRESSION    No diagnosis found.      DISPOSITION/PLAN     DISPOSITION        PATIENT REFERRED TO:  No follow-up provider specified.    DISCHARGE MEDICATIONS:  New Prescriptions    No medications on file       DISCONTINUED MEDICATIONS:  Discontinued Medications    No medications on file              (Please note that portions of this note were completed with a voice recognition program.  Efforts were made to edit the dictations but occasionally words are mis-transcribed.)    Sylvia Jain PA-C (electronically signed)

## 2024-03-20 NOTE — DISCHARGE INSTRUCTIONS
There was a lot of stool with fluid mixed throughout your colon otherwise everything else looks normal.  You have a stable 1.4 cm right adrenal adenoma, follow-up with primary care doctor for continued monitoring no further emergent workup or monitoring indicated at this time.  Do favor your right low back pain could be a pinched nerve.  Follow-up with spine or primary care doctor.  Try muscle relaxer.  If you take Robaxin do not take any other muscle relaxers.  Continue on your naproxen and Tylenol.  Recommend heat/ice and gentle stretching.  Return to ED if any new or worsening symptoms.

## 2024-03-20 NOTE — ED NOTES
Water provided, with provider approval, per pt request. ED Tech in room to redraw potassium level.

## 2024-05-07 ENCOUNTER — APPOINTMENT (OUTPATIENT)
Dept: GENERAL RADIOLOGY | Age: 69
End: 2024-05-07
Payer: MEDICAID

## 2024-05-07 ENCOUNTER — HOSPITAL ENCOUNTER (EMERGENCY)
Age: 69
Discharge: HOME | End: 2024-05-07
Attending: EMERGENCY MEDICINE
Payer: MEDICAID

## 2024-05-07 ENCOUNTER — APPOINTMENT (OUTPATIENT)
Dept: CT IMAGING | Age: 69
End: 2024-05-07
Payer: MEDICAID

## 2024-05-07 VITALS
BODY MASS INDEX: 22.03 KG/M2 | HEART RATE: 88 BPM | OXYGEN SATURATION: 100 % | DIASTOLIC BLOOD PRESSURE: 122 MMHG | HEIGHT: 62 IN | SYSTOLIC BLOOD PRESSURE: 168 MMHG | RESPIRATION RATE: 17 BRPM | TEMPERATURE: 98.8 F | WEIGHT: 119.71 LBS

## 2024-05-07 DIAGNOSIS — R63.4 WEIGHT LOSS: ICD-10-CM

## 2024-05-07 DIAGNOSIS — B37.31 VAGINAL YEAST INFECTION: ICD-10-CM

## 2024-05-07 DIAGNOSIS — R10.9 CHRONIC ABDOMINAL PAIN: Primary | ICD-10-CM

## 2024-05-07 DIAGNOSIS — G89.29 CHRONIC ABDOMINAL PAIN: Primary | ICD-10-CM

## 2024-05-07 LAB
ALBUMIN SERPL-MCNC: 3.9 G/DL (ref 3.4–5)
ALBUMIN/GLOB SERPL: 1.1 {RATIO} (ref 1.1–2.2)
ALP SERPL-CCNC: 79 U/L (ref 40–129)
ALT SERPL-CCNC: 11 U/L (ref 10–40)
ANION GAP SERPL CALCULATED.3IONS-SCNC: 11 MMOL/L (ref 3–16)
AST SERPL-CCNC: 14 U/L (ref 15–37)
BACTERIA URNS QL MICRO: ABNORMAL /HPF
BASOPHILS # BLD: 0.1 K/UL (ref 0–0.2)
BASOPHILS NFR BLD: 0.6 %
BILIRUB SERPL-MCNC: <0.2 MG/DL (ref 0–1)
BILIRUB UR QL STRIP.AUTO: NEGATIVE
BUN SERPL-MCNC: 15 MG/DL (ref 7–20)
CALCIUM SERPL-MCNC: 9.8 MG/DL (ref 8.3–10.6)
CHLORIDE SERPL-SCNC: 104 MMOL/L (ref 99–110)
CLARITY UR: CLEAR
CO2 SERPL-SCNC: 24 MMOL/L (ref 21–32)
COLOR UR: YELLOW
CREAT SERPL-MCNC: 0.7 MG/DL (ref 0.6–1.2)
DEPRECATED RDW RBC AUTO: 15.1 % (ref 12.4–15.4)
EKG ATRIAL RATE: 83 BPM
EKG DIAGNOSIS: NORMAL
EKG P AXIS: 52 DEGREES
EKG P-R INTERVAL: 138 MS
EKG Q-T INTERVAL: 364 MS
EKG QRS DURATION: 84 MS
EKG QTC CALCULATION (BAZETT): 427 MS
EKG R AXIS: -16 DEGREES
EKG T AXIS: 68 DEGREES
EKG VENTRICULAR RATE: 83 BPM
EOSINOPHIL # BLD: 0.2 K/UL (ref 0–0.6)
EOSINOPHIL NFR BLD: 2.6 %
EPI CELLS #/AREA URNS AUTO: 4 /HPF (ref 0–5)
GFR SERPLBLD CREATININE-BSD FMLA CKD-EPI: >90 ML/MIN/{1.73_M2}
GLUCOSE SERPL-MCNC: 131 MG/DL (ref 70–99)
GLUCOSE UR STRIP.AUTO-MCNC: >=1000 MG/DL
HCT VFR BLD AUTO: 41.7 % (ref 36–48)
HGB BLD-MCNC: 13.6 G/DL (ref 12–16)
HGB UR QL STRIP.AUTO: NEGATIVE
HYALINE CASTS #/AREA URNS AUTO: 0 /LPF (ref 0–8)
KETONES UR STRIP.AUTO-MCNC: NEGATIVE MG/DL
LEUKOCYTE ESTERASE UR QL STRIP.AUTO: NEGATIVE
LIPASE SERPL-CCNC: 65 U/L (ref 13–60)
LYMPHOCYTES # BLD: 3.4 K/UL (ref 1–5.1)
LYMPHOCYTES NFR BLD: 36.9 %
MCH RBC QN AUTO: 23.4 PG (ref 26–34)
MCHC RBC AUTO-ENTMCNC: 32.6 G/DL (ref 31–36)
MCV RBC AUTO: 71.8 FL (ref 80–100)
MONOCYTES # BLD: 0.7 K/UL (ref 0–1.3)
MONOCYTES NFR BLD: 7.8 %
NEUTROPHILS # BLD: 4.8 K/UL (ref 1.7–7.7)
NEUTROPHILS NFR BLD: 52.1 %
NITRITE UR QL STRIP.AUTO: NEGATIVE
PH UR STRIP.AUTO: 5.5 [PH] (ref 5–8)
PLATELET # BLD AUTO: 207 K/UL (ref 135–450)
PMV BLD AUTO: 10.5 FL (ref 5–10.5)
POTASSIUM SERPL-SCNC: 3.9 MMOL/L (ref 3.5–5.1)
PROT SERPL-MCNC: 7.6 G/DL (ref 6.4–8.2)
PROT UR STRIP.AUTO-MCNC: 100 MG/DL
RBC # BLD AUTO: 5.81 M/UL (ref 4–5.2)
RBC CLUMPS #/AREA URNS AUTO: 1 /HPF (ref 0–4)
SODIUM SERPL-SCNC: 139 MMOL/L (ref 136–145)
SP GR UR STRIP.AUTO: 1.03 (ref 1–1.03)
TROPONIN, HIGH SENSITIVITY: 16 NG/L (ref 0–14)
TROPONIN, HIGH SENSITIVITY: 17 NG/L (ref 0–14)
UA COMPLETE W REFLEX CULTURE PNL UR: ABNORMAL
UA DIPSTICK W REFLEX MICRO PNL UR: YES
URN SPEC COLLECT METH UR: ABNORMAL
UROBILINOGEN UR STRIP-ACNC: 0.2 E.U./DL
WBC # BLD AUTO: 9.3 K/UL (ref 4–11)
WBC #/AREA URNS AUTO: 4 /HPF (ref 0–5)

## 2024-05-07 PROCEDURE — 99285 EMERGENCY DEPT VISIT HI MDM: CPT

## 2024-05-07 PROCEDURE — 71045 X-RAY EXAM CHEST 1 VIEW: CPT

## 2024-05-07 PROCEDURE — 74177 CT ABD & PELVIS W/CONTRAST: CPT

## 2024-05-07 PROCEDURE — 80053 COMPREHEN METABOLIC PANEL: CPT

## 2024-05-07 PROCEDURE — 96374 THER/PROPH/DIAG INJ IV PUSH: CPT

## 2024-05-07 PROCEDURE — 83690 ASSAY OF LIPASE: CPT

## 2024-05-07 PROCEDURE — 81001 URINALYSIS AUTO W/SCOPE: CPT

## 2024-05-07 PROCEDURE — 6370000000 HC RX 637 (ALT 250 FOR IP): Performed by: PHYSICIAN ASSISTANT

## 2024-05-07 PROCEDURE — 93010 ELECTROCARDIOGRAM REPORT: CPT | Performed by: INTERNAL MEDICINE

## 2024-05-07 PROCEDURE — 6360000004 HC RX CONTRAST MEDICATION: Performed by: PHYSICIAN ASSISTANT

## 2024-05-07 PROCEDURE — 85025 COMPLETE CBC W/AUTO DIFF WBC: CPT

## 2024-05-07 PROCEDURE — 84484 ASSAY OF TROPONIN QUANT: CPT

## 2024-05-07 PROCEDURE — 6360000002 HC RX W HCPCS: Performed by: PHYSICIAN ASSISTANT

## 2024-05-07 PROCEDURE — 93005 ELECTROCARDIOGRAM TRACING: CPT | Performed by: PHYSICIAN ASSISTANT

## 2024-05-07 RX ORDER — ACETAMINOPHEN 325 MG/1
650 TABLET ORAL ONCE
Status: COMPLETED | OUTPATIENT
Start: 2024-05-07 | End: 2024-05-07

## 2024-05-07 RX ORDER — FLUCONAZOLE 150 MG/1
150 TABLET ORAL ONCE
Qty: 1 TABLET | Refills: 0 | Status: SHIPPED | OUTPATIENT
Start: 2024-05-07 | End: 2024-05-07

## 2024-05-07 RX ORDER — DICYCLOMINE HCL 20 MG
20 TABLET ORAL EVERY 6 HOURS PRN
Qty: 21 TABLET | Refills: 0 | Status: SHIPPED | OUTPATIENT
Start: 2024-05-07 | End: 2024-05-14

## 2024-05-07 RX ORDER — KETOROLAC TROMETHAMINE 15 MG/ML
15 INJECTION, SOLUTION INTRAMUSCULAR; INTRAVENOUS ONCE
Status: COMPLETED | OUTPATIENT
Start: 2024-05-07 | End: 2024-05-07

## 2024-05-07 RX ADMIN — IOPAMIDOL 75 ML: 755 INJECTION, SOLUTION INTRAVENOUS at 16:01

## 2024-05-07 RX ADMIN — KETOROLAC TROMETHAMINE 15 MG: 15 INJECTION, SOLUTION INTRAMUSCULAR; INTRAVENOUS at 15:51

## 2024-05-07 RX ADMIN — ACETAMINOPHEN 650 MG: 325 TABLET ORAL at 16:16

## 2024-05-07 ASSESSMENT — PAIN DESCRIPTION - LOCATION
LOCATION: ABDOMEN;BACK
LOCATION: ABDOMEN
LOCATION: ABDOMEN

## 2024-05-07 ASSESSMENT — PAIN - FUNCTIONAL ASSESSMENT
PAIN_FUNCTIONAL_ASSESSMENT: ACTIVITIES ARE NOT PREVENTED
PAIN_FUNCTIONAL_ASSESSMENT: ACTIVITIES ARE NOT PREVENTED
PAIN_FUNCTIONAL_ASSESSMENT: 0-10

## 2024-05-07 ASSESSMENT — PAIN SCALES - GENERAL
PAINLEVEL_OUTOF10: 10
PAINLEVEL_OUTOF10: 10
PAINLEVEL_OUTOF10: 7

## 2024-05-07 ASSESSMENT — PAIN DESCRIPTION - DESCRIPTORS
DESCRIPTORS: PINS AND NEEDLES
DESCRIPTORS: PINS AND NEEDLES
DESCRIPTORS: SHOOTING

## 2024-05-07 ASSESSMENT — LIFESTYLE VARIABLES
HOW OFTEN DO YOU HAVE A DRINK CONTAINING ALCOHOL: NEVER
HOW MANY STANDARD DRINKS CONTAINING ALCOHOL DO YOU HAVE ON A TYPICAL DAY: PATIENT DOES NOT DRINK

## 2024-05-07 ASSESSMENT — PAIN DESCRIPTION - FREQUENCY
FREQUENCY: CONTINUOUS
FREQUENCY: CONTINUOUS

## 2024-05-07 ASSESSMENT — PAIN DESCRIPTION - PAIN TYPE
TYPE: ACUTE PAIN
TYPE: CHRONIC PAIN
TYPE: CHRONIC PAIN

## 2024-05-07 ASSESSMENT — PAIN DESCRIPTION - ONSET
ONSET: ON-GOING
ONSET: ON-GOING

## 2024-05-07 NOTE — ED PROVIDER NOTES
[facility] EMERGENCY DEPARTMENT     EMERGENCY DEPARTMENT ENCOUNTER     Location: [facility] EMERGENCY DEPARTMENT  5/7/2024  Note Started: 4:21 PM EDT 5/7/24      Patient Identification  Meagan Andino is a 68 y.o. female      HPI:Meagan Andino was evaluated in the Emergency Department for pain in her back, chest, abdomen.  Her chest discomfort is been ongoing for the last 6 months.  The pains in her lower chest.  She has had chronic abdominal pain in her upper abdomen for the last year.  She complains of pain in her upper and lower back.  She has been seen by her primary care provider.  She states she is scheduled to go to pain management on 6/17/2024.  She also states that she has been losing weight. Although initial history and physical exam information was obtained by ANNA/NPP/MD/DO (who also dictated a record of this visit), I personally saw the patient and performed and made/approved the management plan and take responsibility for the patient management.      PHYSICAL EXAM:  General: Alert moderately obese black female in no acute distress.  HEENT: Conjunctiva are clear.  Pupils equal round reactive.  Nose is clear.  Oropharynx moist without erythema.  Heart: Regular rate and rhythm.  No murmurs gallops noted.  Lungs: Breath sounds equal bilaterally and clear.  Abdomen: Obese, soft, epigastric tenderness without guarding or rebound.  No masses or organomegaly.  Bowel sounds are normal.    EKG Interpretation  Normal sinus rhythm, rate of 83, voltage criteria for LVH.  Poor R wave progression, age-indeterminate anterior infarct.  Nonspecific ST-T wave changes.  Compared to 2/29/2024 nonspecific ST-T wave changes are less pronounced.  Rhythm strip shows a sinus rhythm with a rate of 83, AL interval 138 ms, QRS 84 ms with no other ectopy as interpreted by me.  Labs Reviewed   CBC WITH AUTO DIFFERENTIAL - Abnormal; Notable for the following components:       Result Value    RBC 5.81 
mouth 2 times daily (with meals)    METOPROLOL SUCCINATE (TOPROL XL) 25 MG EXTENDED RELEASE TABLET    Take 25 mg by mouth daily    MULTIPLE VITAMINS-MINERALS (THERAPEUTIC MULTIVITAMIN-MINERALS) TABLET    Take 1 tablet by mouth daily    POLYETHYLENE GLYCOL (GLYCOLAX) 17 G PACKET    Take 17 g by mouth daily as needed for Constipation    SIMETHICONE (MYLICON) 125 MG CHEWABLE TABLET    Take 1 tablet by mouth daily as needed for Flatulence (Use 1 tablet after dinner to help minimize gaseous bloating at bedtime)       ALLERGIES     Lisinopril    FAMILYHISTORY       Family History   Problem Relation Age of Onset    Colon Cancer Paternal Cousin     Breast Cancer Neg Hx     Uterine Cancer Neg Hx     Ovarian Cancer Neg Hx         SOCIAL HISTORY       Social History     Tobacco Use    Smoking status: Some Days     Passive exposure: Past    Smokeless tobacco: Current   Vaping Use    Vaping Use: Never used   Substance Use Topics    Alcohol use: Yes    Drug use: Yes       SCREENINGS        H[place]inson Coma Scale  Eye Opening: Spontaneous  Best Verbal Response: Oriented  Best Motor Response: Obeys commands  Hankinson Coma Scale Score: 15                CIWA Assessment  BP: (!) 179/105  Pulse: 90           PHYSICAL EXAM  1 or more Elements     ED Triage Vitals   BP Temp Temp Source Pulse Respirations SpO2 Height Weight - Scale   05/07/24 1415 05/07/24 1421 05/07/24 1421 05/07/24 1421 05/07/24 1421 05/07/24 1421 05/07/24 1421 05/07/24 1421   (!) 176/102 98.8 °F (37.1 °C) Oral 95 25 100 % 1.575 m (5' 2\") 54.3 kg (119 lb 11.4 oz)       Physical Exam    ***    DIAGNOSTIC RESULTS   LABS:    Labs Reviewed   CBC WITH AUTO DIFFERENTIAL - Abnormal; Notable for the following components:       Result Value    RBC 5.81 (*)     MCV 71.8 (*)     MCH 23.4 (*)     All other components within normal limits   COMPREHENSIVE METABOLIC PANEL W/ REFLEX TO MG FOR LOW K - Abnormal; Notable for the following components:    Glucose 131 (*)     AST 14 (*)     All

## 2024-05-07 NOTE — ED TRIAGE NOTES
Pt into ER via EMS from home with c/c Abd pain with back pain, onset months ago with weight loss. Pt has been seen several times for this chronic pain.  Pt has an appointment with pain management in July.  Per EMS, pt is hypertensive, pt states she took her BP med this morning.

## 2024-05-07 NOTE — DISCHARGE INSTRUCTIONS
It is important to follow-up with your primary care physician for further evaluation of your symptoms.  Return as needed

## 2024-05-07 NOTE — PROGRESS NOTES
Patient discharged. PIV removed. AVS reviewed. Patient walked to the lobby. Grandson is picking patient up.